# Patient Record
Sex: FEMALE | Race: ASIAN
[De-identification: names, ages, dates, MRNs, and addresses within clinical notes are randomized per-mention and may not be internally consistent; named-entity substitution may affect disease eponyms.]

---

## 2017-01-27 ENCOUNTER — CHARTING TRANS (OUTPATIENT)
Dept: OTHER | Age: 37
End: 2017-01-27

## 2017-05-11 ENCOUNTER — CHARTING TRANS (OUTPATIENT)
Dept: OTHER | Age: 37
End: 2017-05-11

## 2017-05-12 ENCOUNTER — CHARTING TRANS (OUTPATIENT)
Dept: OTHER | Age: 37
End: 2017-05-12

## 2017-05-20 ENCOUNTER — LAB SERVICES (OUTPATIENT)
Dept: OTHER | Age: 37
End: 2017-05-20

## 2017-05-20 LAB — TSH SERPL-ACNC: 2.41 M[IU]/L (ref 0.3–4.82)

## 2017-05-24 ENCOUNTER — CHARTING TRANS (OUTPATIENT)
Dept: OTHER | Age: 37
End: 2017-05-24

## 2017-05-25 ENCOUNTER — CHARTING TRANS (OUTPATIENT)
Dept: OTHER | Age: 37
End: 2017-05-25

## 2017-10-09 ENCOUNTER — CHARTING TRANS (OUTPATIENT)
Dept: OTHER | Age: 37
End: 2017-10-09

## 2017-10-11 ENCOUNTER — LAB SERVICES (OUTPATIENT)
Dept: OTHER | Age: 37
End: 2017-10-11

## 2017-10-11 LAB — TSH SERPL-ACNC: 3.31 M[IU]/L (ref 0.3–4.82)

## 2017-10-19 ENCOUNTER — CHARTING TRANS (OUTPATIENT)
Dept: OTHER | Age: 37
End: 2017-10-19

## 2017-12-29 ENCOUNTER — LAB SERVICES (OUTPATIENT)
Dept: OTHER | Age: 37
End: 2017-12-29

## 2017-12-29 LAB — TSH SERPL-ACNC: 1.16 M[IU]/L (ref 0.3–4.82)

## 2018-01-03 ENCOUNTER — CHARTING TRANS (OUTPATIENT)
Dept: OTHER | Age: 38
End: 2018-01-03

## 2018-01-12 ENCOUNTER — CHARTING TRANS (OUTPATIENT)
Dept: OTHER | Age: 38
End: 2018-01-12

## 2018-06-21 ENCOUNTER — LAB SERVICES (OUTPATIENT)
Dept: OTHER | Age: 38
End: 2018-06-21

## 2018-06-21 ENCOUNTER — MYAURORA ACCOUNT LINK (OUTPATIENT)
Dept: OTHER | Age: 38
End: 2018-06-21

## 2018-06-21 ENCOUNTER — CHARTING TRANS (OUTPATIENT)
Dept: OTHER | Age: 38
End: 2018-06-21

## 2018-06-22 LAB — TSH SERPL DL<=0.05 MIU/L-ACNC: 2.39 M[IU]/L (ref 0.3–4.82)

## 2018-07-31 ENCOUNTER — CHARTING TRANS (OUTPATIENT)
Dept: OTHER | Age: 38
End: 2018-07-31

## 2018-08-02 ENCOUNTER — LAB SERVICES (OUTPATIENT)
Dept: OTHER | Age: 38
End: 2018-08-02

## 2018-08-02 LAB — TSH SERPL DL<=0.05 MIU/L-ACNC: 2.96 M[IU]/L (ref 0.3–4.82)

## 2018-10-16 ENCOUNTER — APPOINTMENT (OUTPATIENT)
Dept: LAB | Age: 38
End: 2018-10-16
Attending: FAMILY MEDICINE
Payer: COMMERCIAL

## 2018-10-16 ENCOUNTER — OFFICE VISIT (OUTPATIENT)
Dept: FAMILY MEDICINE CLINIC | Facility: CLINIC | Age: 38
End: 2018-10-16
Payer: COMMERCIAL

## 2018-10-16 VITALS
DIASTOLIC BLOOD PRESSURE: 62 MMHG | WEIGHT: 162.38 LBS | HEART RATE: 69 BPM | RESPIRATION RATE: 18 BRPM | BODY MASS INDEX: 28.77 KG/M2 | OXYGEN SATURATION: 99 % | HEIGHT: 63 IN | TEMPERATURE: 98 F | SYSTOLIC BLOOD PRESSURE: 114 MMHG

## 2018-10-16 DIAGNOSIS — R07.0 THROAT PAIN: ICD-10-CM

## 2018-10-16 DIAGNOSIS — R45.4 IRRITABILITY AND ANGER: ICD-10-CM

## 2018-10-16 DIAGNOSIS — N64.52 NIPPLE DISCHARGE: ICD-10-CM

## 2018-10-16 DIAGNOSIS — E03.9 HYPOTHYROIDISM, UNSPECIFIED TYPE: Primary | ICD-10-CM

## 2018-10-16 DIAGNOSIS — Z13.1 SCREENING FOR DIABETES MELLITUS: ICD-10-CM

## 2018-10-16 DIAGNOSIS — Z13.0 SCREENING FOR DEFICIENCY ANEMIA: ICD-10-CM

## 2018-10-16 DIAGNOSIS — Z13.220 SCREENING FOR LIPOID DISORDERS: ICD-10-CM

## 2018-10-16 DIAGNOSIS — Z23 NEED FOR VACCINATION: ICD-10-CM

## 2018-10-16 PROCEDURE — 99204 OFFICE O/P NEW MOD 45 MIN: CPT | Performed by: FAMILY MEDICINE

## 2018-10-16 PROCEDURE — 90686 IIV4 VACC NO PRSV 0.5 ML IM: CPT | Performed by: FAMILY MEDICINE

## 2018-10-16 PROCEDURE — 90471 IMMUNIZATION ADMIN: CPT | Performed by: FAMILY MEDICINE

## 2018-10-16 NOTE — PROGRESS NOTES
Jake Villavicencio is a 41year old female. Patient presents with:  Establish Care: New patient: Pt would like Dr to maintain thyroid medication. HPI:   Patient is seen for initial visit.     Hypothyroidism on levothyroxine 88mcg, had labs d exercise.     REVIEW OF SYSTEMS:   GENERAL HEALTH: feels well otherwise  ENT: as per HPI  SKIN: denies any unusual skin lesions or rashes  RESPIRATORY: denies shortness of breath with exertion  CARDIOVASCULAR: denies chest pain on exertion  GI: denies heart

## 2018-10-16 NOTE — PATIENT INSTRUCTIONS
Please avoid stimulating your nipple. Please get your labs done and schedule an appointment for a physical and pap.

## 2018-11-19 ENCOUNTER — APPOINTMENT (OUTPATIENT)
Dept: LAB | Age: 38
End: 2018-11-19
Attending: FAMILY MEDICINE
Payer: COMMERCIAL

## 2018-11-19 ENCOUNTER — OFFICE VISIT (OUTPATIENT)
Dept: FAMILY MEDICINE CLINIC | Facility: CLINIC | Age: 38
End: 2018-11-19
Payer: COMMERCIAL

## 2018-11-19 VITALS
BODY MASS INDEX: 28.73 KG/M2 | SYSTOLIC BLOOD PRESSURE: 102 MMHG | HEIGHT: 62.5 IN | HEART RATE: 79 BPM | OXYGEN SATURATION: 99 % | RESPIRATION RATE: 16 BRPM | WEIGHT: 160.13 LBS | DIASTOLIC BLOOD PRESSURE: 60 MMHG | TEMPERATURE: 97 F

## 2018-11-19 DIAGNOSIS — Z01.419 WELL WOMAN EXAM WITH ROUTINE GYNECOLOGICAL EXAM: ICD-10-CM

## 2018-11-19 DIAGNOSIS — N64.52 NIPPLE DISCHARGE: ICD-10-CM

## 2018-11-19 DIAGNOSIS — Z00.00 ROUTINE GENERAL MEDICAL EXAMINATION AT A HEALTH CARE FACILITY: Primary | ICD-10-CM

## 2018-11-19 PROCEDURE — 88175 CYTOPATH C/V AUTO FLUID REDO: CPT | Performed by: FAMILY MEDICINE

## 2018-11-19 PROCEDURE — 99395 PREV VISIT EST AGE 18-39: CPT | Performed by: FAMILY MEDICINE

## 2018-11-19 PROCEDURE — 87624 HPV HI-RISK TYP POOLED RSLT: CPT | Performed by: FAMILY MEDICINE

## 2018-11-19 NOTE — PROGRESS NOTES
Teo Zaidi is a 41year old female here for Patient presents with: Well Adult: Physical and pap    HPI:   Patient is seen for annual physical and pap.   Previous paps were normal    PAST MEDICAL HISTORY:     Diagnosis Date   • Hypothyro nasal congestion, allergies, sinus pain, nosebleed or sore throat. Heme/lymph: No unusual bleeding or bruising, no lymph node enlargement or tenderness. Endocrine: No thyroid symptoms. No symptoms of diabetes.   Respiratory: No cough, shortness of breath done. Bimanual: No cervical motion tenderness. Uterus anteverted. Adnexae nontender, no masses. MUSCULOSKELETAL: Back and extremities within normal limits  EXTREMITIES: no cyanosis, clubbing or edema.  Peripheral pulses normal.  NEURO: Nonfocal exam. Linnea Bledsoe

## 2018-11-19 NOTE — PATIENT INSTRUCTIONS
Prevention Guidelines, Women Ages 25 to 44  Screening tests and vaccines are an important part of managing your health. A screening test is done to find possible disorders or diseases in people who don't have any symptoms.  The goal is to find a disease e Type 2 diabetes All women with prediabetes Every year   Gonorrhea Sexually active women at increased risk for infection At routine exams   Hepatitis C Anyone at increased risk At routine exams   HIV All women should be tested at least once for HIV between Meningococcal Women at increased risk for infection should talk with their healthcare provider 1 or more doses   Pneumococcal conjugate vaccine (PCV13) and pneumococcal polysaccharide vaccine (PPSV23) Women at increased risk for infection should talk with © 3470-6817 [address]. [address] [facility] [place] [facility] [place]. All rights reserved. This information is not intended as a substitute for professional medical care. Always follow your healthcare professional's instructions.

## 2018-11-23 ENCOUNTER — IMAGING SERVICES (OUTPATIENT)
Dept: OTHER | Age: 38
End: 2018-11-23

## 2018-11-28 VITALS
SYSTOLIC BLOOD PRESSURE: 92 MMHG | DIASTOLIC BLOOD PRESSURE: 60 MMHG | BODY MASS INDEX: 26.75 KG/M2 | WEIGHT: 151 LBS | HEIGHT: 63 IN | HEART RATE: 66 BPM

## 2018-11-28 VITALS
BODY MASS INDEX: 26.22 KG/M2 | HEART RATE: 64 BPM | SYSTOLIC BLOOD PRESSURE: 110 MMHG | DIASTOLIC BLOOD PRESSURE: 62 MMHG | WEIGHT: 148 LBS | HEIGHT: 63 IN

## 2018-12-24 DIAGNOSIS — E03.8 OTHER SPECIFIED HYPOTHYROIDISM: Primary | ICD-10-CM

## 2019-03-05 VITALS
DIASTOLIC BLOOD PRESSURE: 60 MMHG | WEIGHT: 156 LBS | SYSTOLIC BLOOD PRESSURE: 96 MMHG | BODY MASS INDEX: 27.64 KG/M2 | HEIGHT: 63 IN | HEART RATE: 68 BPM

## 2019-05-17 ENCOUNTER — PATIENT MESSAGE (OUTPATIENT)
Dept: FAMILY MEDICINE CLINIC | Facility: CLINIC | Age: 39
End: 2019-05-17

## 2019-05-17 DIAGNOSIS — E03.9 HYPOTHYROIDISM, UNSPECIFIED TYPE: Primary | ICD-10-CM

## 2019-05-19 NOTE — TELEPHONE ENCOUNTER
Labs for TSH are due in October and all other routine lbs are due in November, advised patient o schedule an appointment for November abd she can come fasting for her labs.

## 2019-05-19 NOTE — TELEPHONE ENCOUNTER
[facility], please advise?     Last labs done in Oct and November    No future orders

## 2019-05-19 NOTE — TELEPHONE ENCOUNTER
From: Katt Lambert  To:  Faby Lawson MD  Sent: 5/17/2019 11:19 PM CDT  Subject: Non-Urgent Medical Question    Coleman Rodriguez let me know when my next lab test is scheduled to be     Thanks in advance,  [address]

## 2019-05-20 ENCOUNTER — PATIENT MESSAGE (OUTPATIENT)
Dept: FAMILY MEDICINE CLINIC | Facility: CLINIC | Age: 39
End: 2019-05-20

## 2019-05-20 NOTE — TELEPHONE ENCOUNTER
Dr Tawnya Tyler, here are your last lab notes    No repeat lipids were ordered just TSH per last My chart message the TSH was not due until October?     Viewed by Christobal Peabody on 10/20/2018 10:49 PM   Written by Luan Griffin MD on 10/18/2018 12

## 2019-05-20 NOTE — TELEPHONE ENCOUNTER
From: Christobal Peabody  To:  Luan Griffin MD  Sent: 5/20/2019 11:46 AM CDT  Subject: Non-Urgent Medical Question    Hello doctor,  Thanks for the response regarding my TSH blood test.  I remember that u told me to do blood test for LDL,

## 2019-05-20 NOTE — TELEPHONE ENCOUNTER
Patient was supposed to have done her thyroid labs in October, please inform her we can recheck her lipids in November when she is in for her physical, she can continue to limit saturated fats and cholesterol in diet.

## 2019-12-05 ENCOUNTER — PATIENT MESSAGE (OUTPATIENT)
Dept: FAMILY MEDICINE CLINIC | Facility: CLINIC | Age: 39
End: 2019-12-05

## 2019-12-05 NOTE — TELEPHONE ENCOUNTER
From: Radha Dickerson  To: Gege Eid MD  Sent: 12/5/2019 12:47 PM CST  Subject: Other    Hi,  I have an upcoming appointment with  on Dec 9th. Please let me know if have to go for lab test prior to my visit.   Also I have

## 2019-12-09 ENCOUNTER — OFFICE VISIT (OUTPATIENT)
Dept: FAMILY MEDICINE CLINIC | Facility: CLINIC | Age: 39
End: 2019-12-09
Payer: COMMERCIAL

## 2019-12-09 ENCOUNTER — APPOINTMENT (OUTPATIENT)
Dept: LAB | Age: 39
End: 2019-12-09
Attending: FAMILY MEDICINE
Payer: COMMERCIAL

## 2019-12-09 VITALS
DIASTOLIC BLOOD PRESSURE: 62 MMHG | HEART RATE: 60 BPM | OXYGEN SATURATION: 99 % | WEIGHT: 143 LBS | HEIGHT: 62.5 IN | BODY MASS INDEX: 25.66 KG/M2 | TEMPERATURE: 98 F | SYSTOLIC BLOOD PRESSURE: 98 MMHG | RESPIRATION RATE: 18 BRPM

## 2019-12-09 DIAGNOSIS — Z00.00 ROUTINE GENERAL MEDICAL EXAMINATION AT A HEALTH CARE FACILITY: ICD-10-CM

## 2019-12-09 DIAGNOSIS — E03.9 HYPOTHYROIDISM, UNSPECIFIED TYPE: ICD-10-CM

## 2019-12-09 DIAGNOSIS — Z00.00 ROUTINE GENERAL MEDICAL EXAMINATION AT A HEALTH CARE FACILITY: Primary | ICD-10-CM

## 2019-12-09 DIAGNOSIS — Z23 NEED FOR VACCINATION: ICD-10-CM

## 2019-12-09 PROCEDURE — 84439 ASSAY OF FREE THYROXINE: CPT

## 2019-12-09 PROCEDURE — 84443 ASSAY THYROID STIM HORMONE: CPT

## 2019-12-09 PROCEDURE — 90471 IMMUNIZATION ADMIN: CPT | Performed by: FAMILY MEDICINE

## 2019-12-09 PROCEDURE — 99395 PREV VISIT EST AGE 18-39: CPT | Performed by: FAMILY MEDICINE

## 2019-12-09 PROCEDURE — 90686 IIV4 VACC NO PRSV 0.5 ML IM: CPT | Performed by: FAMILY MEDICINE

## 2019-12-09 NOTE — PATIENT INSTRUCTIONS
Prevention Guidelines, Women Ages 25 to 44  Screening tests and vaccines are an important part of managing your health. A screening test is done to find possible disorders or diseases in people who don't have any symptoms.  The goal is to find a disease e Type 2 diabetes, prediabetes All women diagnosed with gestational diabetes Lifelong testing every 3 years   Type 2 diabetes All women with prediabetes Every year   Gonorrhea Sexually active women at increased risk for infection At routine exams   Hepatitis Measles, mumps, rubella (MMR) All women in this age group who have no record of these infections or vaccines 1 or 2 doses   Meningococcal Women at increased risk for infection should talk with their healthcare provider 1 or more doses   Pneumococcal conjug © 4164-0293 [address]. [facility] [facility] [place] [place]. All rights reserved. This information is not intended as a substitute for professional medical care. Always follow your healthcare professional's instructions.

## 2019-12-09 NOTE — PROGRESS NOTES
Ryley Scruggs is a 46year old female.   Patient presents with:  Physical    HPI:   Patient is seen for annual physical  Pap done last year was normal.    Hypothyroidism: compliant with medication, patient states is currently taking Thyrono palpitations. Gastrointestinal: Negative for nausea, abdominal pain, constipation and blood in stool. Endocrine: Negative for cold intolerance, heat intolerance and polyuria.    Genitourinary: Negative for dysuria, urgency, frequency and difficulty urin time. She has normal reflexes. Skin: Skin is warm. No rash noted. Psychiatric: She has a normal mood and affect.  Her behavior is normal. Judgment and thought content normal.     ASSESSMENT AND PLAN:   Edy Winkler was seen today for physical.    Diagnoses

## 2020-04-22 ENCOUNTER — TELEMEDICINE (OUTPATIENT)
Dept: FAMILY MEDICINE CLINIC | Facility: CLINIC | Age: 40
End: 2020-04-22

## 2020-04-22 ENCOUNTER — PATIENT MESSAGE (OUTPATIENT)
Dept: FAMILY MEDICINE CLINIC | Facility: CLINIC | Age: 40
End: 2020-04-22

## 2020-04-22 ENCOUNTER — E-VISIT (OUTPATIENT)
Dept: FAMILY MEDICINE CLINIC | Facility: CLINIC | Age: 40
End: 2020-04-22

## 2020-04-22 DIAGNOSIS — R21 RASH AND NONSPECIFIC SKIN ERUPTION: Primary | ICD-10-CM

## 2020-04-22 DIAGNOSIS — Z02.9 ADMINISTRATIVE ENCOUNTER: Primary | ICD-10-CM

## 2020-04-22 DIAGNOSIS — K59.00 CONSTIPATION, UNSPECIFIED CONSTIPATION TYPE: ICD-10-CM

## 2020-04-22 PROCEDURE — 99213 OFFICE O/P EST LOW 20 MIN: CPT | Performed by: FAMILY MEDICINE

## 2020-04-22 RX ORDER — PREDNISONE 10 MG/1
TABLET ORAL
Qty: 21 TABLET | Refills: 0 | Status: SHIPPED | OUTPATIENT
Start: 2020-04-22 | End: 2020-04-28

## 2020-04-22 RX ORDER — LEVOTHYROXINE SODIUM 88 UG/1
TABLET ORAL
COMMUNITY
Start: 2015-10-15 | End: 2020-08-06

## 2020-04-22 NOTE — TELEPHONE ENCOUNTER
From: [place]  To: Rocio Decker MD  Sent: 4/22/2020 8:31 AM CDT  Subject: Non-Urgent Jolanta Gan Dr,  I m facing a skin problem now. It starts like a small mosquito bite and after itching it becomes like boils.  It

## 2020-04-22 NOTE — PROGRESS NOTES
Lisa Valdes is a 45year old female.   Patient presents with:  Skin:  itching    HPI:   Lisa Valdes is a 45year old female seen via video visit complaining of rash with intermittent itching for the past 4-5 days, state Rash and nonspecific skin eruption  -     predniSONE 10 MG Oral Tab;  Take 6 tablets (60 mg total) by mouth daily for 1 day, THEN 5 tablets (50 mg total) daily for 1 day, THEN 4 tablets (40 mg total) daily for 1 day, THEN 3 tablets (30 mg total) daily for 1

## 2020-04-22 NOTE — PATIENT INSTRUCTIONS
Please drink prune juice daily to see if it helps with the constipation, you can take over the counter Miralax if needed. If rash is not better in 1 week, I would like to see you in the office.     Treating Constipation    Constipation is a common and of constipation, to rule out other causes such as medicines or thyroid disease. Date Last Reviewed: 7/1/2016  © 1621-1615 [address]. [address] [facility] [place] [address] [place] [address]. All rights reserved.  This information is not intended as a substit

## 2020-04-22 NOTE — TELEPHONE ENCOUNTER
Routing to provider so you have pics available    [facility] desk team, please call to schedule patient for video visit today with Dr. Elaine Anderson    Thanks!

## 2020-04-28 ENCOUNTER — OFFICE VISIT (OUTPATIENT)
Dept: FAMILY MEDICINE CLINIC | Facility: CLINIC | Age: 40
End: 2020-04-28
Payer: COMMERCIAL

## 2020-04-28 VITALS
RESPIRATION RATE: 16 BRPM | WEIGHT: 152 LBS | HEIGHT: 62.5 IN | TEMPERATURE: 98 F | HEART RATE: 81 BPM | DIASTOLIC BLOOD PRESSURE: 78 MMHG | BODY MASS INDEX: 27.27 KG/M2 | SYSTOLIC BLOOD PRESSURE: 100 MMHG | OXYGEN SATURATION: 99 %

## 2020-04-28 DIAGNOSIS — R21 RASH AND NONSPECIFIC SKIN ERUPTION: Primary | ICD-10-CM

## 2020-04-28 PROCEDURE — 99213 OFFICE O/P EST LOW 20 MIN: CPT | Performed by: FAMILY MEDICINE

## 2020-04-28 NOTE — PROGRESS NOTES
Ryley Scruggs is a 45year old female.   Patient presents with:  Rash Skin Problem    HPI:   Ryley Scruggs is a 45year old female lisa today for follow up of rash, patient states finished the oral steroid as prescribed tod without murmur    ASSESSMENT AND PLAN:   Latonia Peterson was seen today for rash skin problem. Diagnoses and all orders for this visit:    Rash and nonspecific skin eruption  -     DERM - INTERNAL  Called 's office and patient will be seen today.

## 2020-05-07 ENCOUNTER — MED REC SCAN ONLY (OUTPATIENT)
Dept: FAMILY MEDICINE CLINIC | Facility: CLINIC | Age: 40
End: 2020-05-07

## 2020-06-25 ENCOUNTER — PATIENT MESSAGE (OUTPATIENT)
Dept: FAMILY MEDICINE CLINIC | Facility: CLINIC | Age: 40
End: 2020-06-25

## 2020-06-26 RX ORDER — LEVOTHYROXINE SODIUM 88 UG/1
88 TABLET ORAL
Qty: 90 TABLET | Refills: 1 | Status: SHIPPED | OUTPATIENT
Start: 2020-06-26 | End: 2020-06-29

## 2020-06-26 NOTE — TELEPHONE ENCOUNTER
From: Gisel Colunga  To: Mehnaz Escalera MD  Sent: 6/25/2020 12:58 PM CDT  Subject: Prescription Question    [facility] doctor,  How are you?   I hereby request you for prescription med refill for levothyroxine 88 mcg through [employer]

## 2020-06-27 ENCOUNTER — PATIENT MESSAGE (OUTPATIENT)
Dept: FAMILY MEDICINE CLINIC | Facility: CLINIC | Age: 40
End: 2020-06-27

## 2020-06-29 RX ORDER — LEVOTHYROXINE SODIUM 88 UG/1
88 TABLET ORAL
Qty: 90 TABLET | Refills: 1 | Status: SHIPPED | OUTPATIENT
Start: 2020-06-29 | End: 2020-07-06

## 2020-06-29 NOTE — TELEPHONE ENCOUNTER
From: Jake Villavicencio  To:  Daryle Powers, MD  Sent: 6/27/2020 3:41 PM CDT  Subject: Prescription Question    Hello nurse/ doctor,  I received message from [facility] pharmacy for prescription refill whereas I had requested for refill in [facility]

## 2020-07-01 ENCOUNTER — OFFICE VISIT (OUTPATIENT)
Dept: FAMILY MEDICINE CLINIC | Facility: CLINIC | Age: 40
End: 2020-07-01
Payer: COMMERCIAL

## 2020-07-01 ENCOUNTER — TELEPHONE (OUTPATIENT)
Dept: FAMILY MEDICINE CLINIC | Facility: CLINIC | Age: 40
End: 2020-07-01

## 2020-07-01 VITALS
TEMPERATURE: 98 F | WEIGHT: 147 LBS | RESPIRATION RATE: 18 BRPM | SYSTOLIC BLOOD PRESSURE: 98 MMHG | HEART RATE: 82 BPM | DIASTOLIC BLOOD PRESSURE: 60 MMHG | HEIGHT: 62.5 IN | BODY MASS INDEX: 26.38 KG/M2 | OXYGEN SATURATION: 98 %

## 2020-07-01 DIAGNOSIS — R07.0 THROAT PAIN: ICD-10-CM

## 2020-07-01 DIAGNOSIS — E03.9 HYPOTHYROIDISM, UNSPECIFIED TYPE: Primary | ICD-10-CM

## 2020-07-01 DIAGNOSIS — R19.8 GLOBUS SENSATION: ICD-10-CM

## 2020-07-01 PROCEDURE — 99213 OFFICE O/P EST LOW 20 MIN: CPT | Performed by: FAMILY MEDICINE

## 2020-07-01 NOTE — TELEPHONE ENCOUNTER
Patient returned call , she said it is more thyroid related , there is discomfort when swallowing , no soar throat no other symptoms at all . She said it feels like something is stuck in her throat .

## 2020-07-01 NOTE — PROGRESS NOTES
Evelia Pond is a 45year old female.   Patient presents with:  Throat Problem    HPI:   Evelia Pond is a 45year old female complaining of throat discomfort on and off for the past several months, patient states feels l tenderness    ASSESSMENT AND PLAN:   Thurpj Jc was seen today for throat problem.     Diagnoses and all orders for this visit:    Hypothyroidism, unspecified type  -     ASSAY, THYROID STIM HORMONE  -     FREE T4 (FREE THYROXINE)  -     TRIIODOTHYRONINE (T3

## 2020-07-01 NOTE — TELEPHONE ENCOUNTER
Pt scheduled for throat discomfort today at 11    I left message for the patient to call back. Needs to be triaged.

## 2020-07-02 LAB
T3, TOTAL: 98 NG/DL (ref 76–181)
T4, FREE: 1.4 NG/DL (ref 0.8–1.8)
THYROGLOBULIN ANTIBODIES: 4 IU/ML
THYROID PEROXIDASE$ANTIBODIES: 1 IU/ML
TSH: 0.26 MIU/L

## 2020-07-06 ENCOUNTER — TELEPHONE (OUTPATIENT)
Dept: FAMILY MEDICINE CLINIC | Facility: CLINIC | Age: 40
End: 2020-07-06

## 2020-07-06 RX ORDER — LEVOTHYROXINE SODIUM 88 UG/1
88 TABLET ORAL
Qty: 90 TABLET | Refills: 0 | Status: SHIPPED | OUTPATIENT
Start: 2020-07-06 | End: 2021-09-03

## 2020-07-06 NOTE — TELEPHONE ENCOUNTER
Patient had labs done and saw results on Mychart. Would like to speak to someone about questions he has.

## 2020-07-06 NOTE — TELEPHONE ENCOUNTER
Notes recorded by Raisa Herrmann MD on 7/2/2020 at 10:05 PM CDT  Thyroid function is too well controlled, please do not take an additional dose once a week and stay on the current dose. Recheck labs in 6 weeks.     Called pt to inform per PCP indicated

## 2021-03-27 ENCOUNTER — TELEPHONE (OUTPATIENT)
Dept: SCHEDULING | Age: 41
End: 2021-03-27

## 2021-06-08 ENCOUNTER — TELEPHONE (OUTPATIENT)
Dept: FAMILY MEDICINE CLINIC | Facility: CLINIC | Age: 41
End: 2021-06-08

## 2021-06-08 NOTE — TELEPHONE ENCOUNTER
Called patient regarding dizziness. States started about a week ago, only once in awhile at first.  Now is having more frequent episodes of feeling dizzy. Denies and hx heat trauma. Denies HA, visual changes, N/T, N/V.  States she actually feels better is

## 2021-06-08 NOTE — TELEPHONE ENCOUNTER
Called pt to schedule annual pe, asked for sooner date than 23rd. Said she has been experiencing dizziness for the last 7-10 days. Scheduled her for appt for both separately. Does she need to be seen sooner or triaged? Please advise.      Future Appointment

## 2021-06-10 ENCOUNTER — OFFICE VISIT (OUTPATIENT)
Dept: FAMILY MEDICINE CLINIC | Facility: CLINIC | Age: 41
End: 2021-06-10
Payer: COMMERCIAL

## 2021-06-10 VITALS
RESPIRATION RATE: 18 BRPM | DIASTOLIC BLOOD PRESSURE: 72 MMHG | HEART RATE: 80 BPM | BODY MASS INDEX: 26.22 KG/M2 | SYSTOLIC BLOOD PRESSURE: 100 MMHG | WEIGHT: 148 LBS | TEMPERATURE: 98 F | OXYGEN SATURATION: 98 % | HEIGHT: 63 IN

## 2021-06-10 DIAGNOSIS — R42 DIZZINESS: Primary | ICD-10-CM

## 2021-06-10 DIAGNOSIS — E03.9 HYPOTHYROIDISM, UNSPECIFIED TYPE: ICD-10-CM

## 2021-06-10 DIAGNOSIS — Z13.0 SCREENING FOR DEFICIENCY ANEMIA: ICD-10-CM

## 2021-06-10 DIAGNOSIS — Z12.31 ENCOUNTER FOR SCREENING MAMMOGRAM FOR MALIGNANT NEOPLASM OF BREAST: ICD-10-CM

## 2021-06-10 DIAGNOSIS — R53.83 FATIGUE, UNSPECIFIED TYPE: ICD-10-CM

## 2021-06-10 DIAGNOSIS — Z13.220 SCREENING FOR LIPOID DISORDERS: ICD-10-CM

## 2021-06-10 PROCEDURE — 3078F DIAST BP <80 MM HG: CPT | Performed by: FAMILY MEDICINE

## 2021-06-10 PROCEDURE — 3008F BODY MASS INDEX DOCD: CPT | Performed by: FAMILY MEDICINE

## 2021-06-10 PROCEDURE — 99213 OFFICE O/P EST LOW 20 MIN: CPT | Performed by: FAMILY MEDICINE

## 2021-06-10 PROCEDURE — 3074F SYST BP LT 130 MM HG: CPT | Performed by: FAMILY MEDICINE

## 2021-06-10 RX ORDER — LEVOTHYROXINE SODIUM 20 UG/ML
INJECTION, SOLUTION INTRAVENOUS
COMMUNITY
Start: 2015-05-01 | End: 2021-06-10 | Stop reason: CLARIF

## 2021-06-10 NOTE — PROGRESS NOTES
Radha Dickerson is a 37year old female.   Patient presents with:  Dizziness    HPI:   Radha Dickerson is a 37year old female with history of hypothyroidism complaining of dizziness for the past 2 weeks, states has noticed it DIFFERENTIAL WITH PLATELET    Screening for lipoid disorders  -     LIPID PANEL; Future  -     LIPID PANEL    Fatigue, unspecified type  -     COMP METABOLIC PANEL (14);  Future  -     VITAMIN D, 25-HYDROXY; Future  -     COMP METABOLIC PANEL (14)  -     VI

## 2021-06-10 NOTE — PATIENT INSTRUCTIONS
Please keep a log of your symptoms. Get an eye exam done. Will call with lab results when available    Keep the temperature at home cool at 72 degrees, hydrate well.

## 2021-06-15 ENCOUNTER — PATIENT MESSAGE (OUTPATIENT)
Dept: FAMILY MEDICINE CLINIC | Facility: CLINIC | Age: 41
End: 2021-06-15

## 2021-06-16 NOTE — TELEPHONE ENCOUNTER
From: Jenni Mauro  To: Pilar Luo MD  Sent: 6/15/2021 10:30 PM CDT  Subject: Test Results Question    Hello doctor,  How are my test results? I didn't get any follow up call till now.   Thanks and Christophe Manuel

## 2021-06-17 RX ORDER — ERGOCALCIFEROL 1.25 MG/1
50000 CAPSULE ORAL WEEKLY
Qty: 4 CAPSULE | Refills: 2 | Status: SHIPPED | OUTPATIENT
Start: 2021-06-17 | End: 2021-09-17

## 2021-06-17 NOTE — TELEPHONE ENCOUNTER
Pt calling back to follow up on vitamin D. Let her know I spoke with nurse and that we are working on getting the order sent over asap. Pt would like to  today.

## 2021-06-17 NOTE — TELEPHONE ENCOUNTER
Pt following up on medication for vitamin D. Would like a call back once it is sent over to the pharmacy. Please advise.

## 2021-06-25 ENCOUNTER — OFFICE VISIT (OUTPATIENT)
Dept: FAMILY MEDICINE CLINIC | Facility: CLINIC | Age: 41
End: 2021-06-25
Payer: COMMERCIAL

## 2021-06-25 VITALS
BODY MASS INDEX: 26.22 KG/M2 | WEIGHT: 148 LBS | HEIGHT: 63 IN | RESPIRATION RATE: 18 BRPM | HEART RATE: 62 BPM | SYSTOLIC BLOOD PRESSURE: 98 MMHG | TEMPERATURE: 97 F | OXYGEN SATURATION: 100 % | DIASTOLIC BLOOD PRESSURE: 62 MMHG

## 2021-06-25 DIAGNOSIS — E55.9 VITAMIN D DEFICIENCY: ICD-10-CM

## 2021-06-25 DIAGNOSIS — Z00.00 ROUTINE GENERAL MEDICAL EXAMINATION AT A HEALTH CARE FACILITY: Primary | ICD-10-CM

## 2021-06-25 DIAGNOSIS — R42 DIZZINESS: ICD-10-CM

## 2021-06-25 DIAGNOSIS — Z12.31 ENCOUNTER FOR SCREENING MAMMOGRAM FOR MALIGNANT NEOPLASM OF BREAST: ICD-10-CM

## 2021-06-25 DIAGNOSIS — E03.9 HYPOTHYROIDISM, UNSPECIFIED TYPE: ICD-10-CM

## 2021-06-25 PROCEDURE — 99396 PREV VISIT EST AGE 40-64: CPT | Performed by: FAMILY MEDICINE

## 2021-06-25 PROCEDURE — 3078F DIAST BP <80 MM HG: CPT | Performed by: FAMILY MEDICINE

## 2021-06-25 PROCEDURE — 3008F BODY MASS INDEX DOCD: CPT | Performed by: FAMILY MEDICINE

## 2021-06-25 PROCEDURE — 3074F SYST BP LT 130 MM HG: CPT | Performed by: FAMILY MEDICINE

## 2021-06-25 NOTE — PROGRESS NOTES
Dione Browning is a 37year old female.   Patient presents with:  Physical    HPI:   Dione Browning is a 37year old female with no significant past medical history seen for annual physical.  Pap is up-to-date  Does do breast Sexual activity: Yes        Partners: Male        Birth control/protection: Rhythm    Other Topics      Concerns:        Caffeine Concern: Yes          tea - 2 cups per day        Exercise: Yes          2x in a week - walking        Seat Belt: Yes        B Mucous membranes are moist.      Pharynx: Oropharynx is clear. Eyes:      Extraocular Movements: Extraocular movements intact. Conjunctiva/sclera: Conjunctivae normal.      Pupils: Pupils are equal, round, and reactive to light.    Neck:      Thyroid - 400 Thousand/uL 262   MPV      7.5 - 12.5 fL 10.9   NEUTROPHILS ABSOLUTE      1,500 - 7,800 cells/uL 2,151   ABSOLUTE LYMPHOCYTES      850 - 3,900 cells/uL 1,818   ABSOLUTE MONOCYTES      200 - 950 cells/uL 351   ABSOLUTE EOSINOPHILS      15 - 500 cells/ immune to varicella to fax us the reports and schedule a nurse appointment for vaccination. Encounter for screening mammogram for malignant neoplasm of breast  -     [facility] SHEILA 2D+3D SCREENING BILAT (CPT=[id number]);  Future    Hypothyroidism, unspecified

## 2021-06-25 NOTE — PATIENT INSTRUCTIONS
Prevention Guidelines, Women Ages 36 to 52  Screening tests and vaccines are an important part of managing your health. A screening test is done to find diseases in people who don't have any symptoms.  The goal is to find a disease early so lifestyle avelar sigmoidoscopy every 5 years, or  · Colonoscopy every 10 years, or  · CT colonography (virtual colonoscopy) every 5 years, or  · Yearly fecal occult blood test, or  · Yearly fecal immunochemical test every year, or  · Stool DNA test, every 3 years  If you c least 4 weeks after the first dose   Hepatitis A Women at increased risk for infection–talk with your healthcare provider 2 doses given 6 months apart   Hepatitis B Women at increased risk for infection–talk with your healthcare provider 3 doses over 6 mon American Academy of Ophthalmology  Silvia last reviewed this educational content on 11/1/2017  © 0787-6129 [employer] [address]. All rights reserved. This information is not intended as a substitute for professional medical care.  Always follow your

## 2021-06-28 ENCOUNTER — MED REC SCAN ONLY (OUTPATIENT)
Dept: FAMILY MEDICINE CLINIC | Facility: CLINIC | Age: 41
End: 2021-06-28

## 2021-08-22 ENCOUNTER — PATIENT MESSAGE (OUTPATIENT)
Dept: FAMILY MEDICINE CLINIC | Facility: CLINIC | Age: 41
End: 2021-08-22

## 2021-08-23 NOTE — TELEPHONE ENCOUNTER
From: Jake Villavicencio  To: Daryle Powers, MD  Sent: 8/22/2021 9:12 PM CDT  Subject: Visit Follow-up Question    Hello doctor,  My giddiness condition has not improved even after taking medication for Vitamin D supplements.  Gino sanders

## 2021-08-27 ENCOUNTER — TELEPHONE (OUTPATIENT)
Dept: FAMILY MEDICINE CLINIC | Facility: CLINIC | Age: 41
End: 2021-08-27

## 2021-08-27 NOTE — TELEPHONE ENCOUNTER
still not better - discussed issue at physical appt in June.   no openings for follow up until 9/13/21. patient does not want to wait that long.    please advise

## 2021-09-03 ENCOUNTER — OFFICE VISIT (OUTPATIENT)
Dept: FAMILY MEDICINE CLINIC | Facility: CLINIC | Age: 41
End: 2021-09-03
Payer: COMMERCIAL

## 2021-09-03 ENCOUNTER — TELEPHONE (OUTPATIENT)
Dept: FAMILY MEDICINE CLINIC | Facility: CLINIC | Age: 41
End: 2021-09-03

## 2021-09-03 VITALS
OXYGEN SATURATION: 99 % | RESPIRATION RATE: 16 BRPM | WEIGHT: 150 LBS | HEIGHT: 63 IN | BODY MASS INDEX: 26.58 KG/M2 | TEMPERATURE: 97 F

## 2021-09-03 DIAGNOSIS — R42 DIZZINESS: Primary | ICD-10-CM

## 2021-09-03 PROCEDURE — 99213 OFFICE O/P EST LOW 20 MIN: CPT | Performed by: FAMILY MEDICINE

## 2021-09-03 PROCEDURE — 3008F BODY MASS INDEX DOCD: CPT | Performed by: FAMILY MEDICINE

## 2021-09-03 NOTE — PATIENT INSTRUCTIONS
Please continue to monitor your symptoms and keep a symptom diary, will call with CT results when available. Please schedule appointment with physical therapist for evaluation.

## 2021-09-03 NOTE — TELEPHONE ENCOUNTER
Pt has an appointment scheduled on Tues 9/7/21 for Ct of sinus, rep said this test should be done w/o contrast so they need new order sent

## 2021-09-03 NOTE — PROGRESS NOTES
Karolina Herzog is a 37year old female. Patient presents with:  Dizziness: f/u.      HPI:   Karolina Herzog is a 37year old female seen for follow up of dizziness, Patient states the dizziness is more frequent, described it as are clear  NECK: supple,no adenopathy,no bruits  LUNGS: clear to auscultation  CARDIO: RRR without murmur  EXTREMITIES: no cyanosis, clubbing or edema  NEURO: CN 2-12 are normal, no focal motor or sensory deficits on exam.    ASSESSMENT AND PLAN:   Subashi

## 2022-11-15 ENCOUNTER — OFFICE VISIT (OUTPATIENT)
Dept: FAMILY MEDICINE CLINIC | Facility: CLINIC | Age: 42
End: 2022-11-15
Payer: COMMERCIAL

## 2022-11-15 VITALS
RESPIRATION RATE: 18 BRPM | SYSTOLIC BLOOD PRESSURE: 110 MMHG | WEIGHT: 135 LBS | HEIGHT: 63 IN | OXYGEN SATURATION: 98 % | TEMPERATURE: 98 F | BODY MASS INDEX: 23.92 KG/M2 | DIASTOLIC BLOOD PRESSURE: 72 MMHG | HEART RATE: 79 BPM

## 2022-11-15 DIAGNOSIS — Z12.31 ENCOUNTER FOR SCREENING MAMMOGRAM FOR MALIGNANT NEOPLASM OF BREAST: ICD-10-CM

## 2022-11-15 DIAGNOSIS — Z01.419 ENCOUNTER FOR ROUTINE GYNECOLOGICAL EXAMINATION WITH PAPANICOLAOU SMEAR OF CERVIX: ICD-10-CM

## 2022-11-15 DIAGNOSIS — Z00.00 ROUTINE GENERAL MEDICAL EXAMINATION AT A HEALTH CARE FACILITY: Primary | ICD-10-CM

## 2022-11-15 DIAGNOSIS — Z23 NEED FOR VACCINATION: ICD-10-CM

## 2022-11-15 DIAGNOSIS — E55.9 VITAMIN D DEFICIENCY: ICD-10-CM

## 2022-11-15 PROCEDURE — 3078F DIAST BP <80 MM HG: CPT | Performed by: FAMILY MEDICINE

## 2022-11-15 PROCEDURE — 90686 IIV4 VACC NO PRSV 0.5 ML IM: CPT | Performed by: FAMILY MEDICINE

## 2022-11-15 PROCEDURE — 87624 HPV HI-RISK TYP POOLED RSLT: CPT | Performed by: FAMILY MEDICINE

## 2022-11-15 PROCEDURE — 3008F BODY MASS INDEX DOCD: CPT | Performed by: FAMILY MEDICINE

## 2022-11-15 PROCEDURE — 99396 PREV VISIT EST AGE 40-64: CPT | Performed by: FAMILY MEDICINE

## 2022-11-15 PROCEDURE — 88175 CYTOPATH C/V AUTO FLUID REDO: CPT | Performed by: FAMILY MEDICINE

## 2022-11-15 PROCEDURE — 90471 IMMUNIZATION ADMIN: CPT | Performed by: FAMILY MEDICINE

## 2022-11-15 PROCEDURE — 3074F SYST BP LT 130 MM HG: CPT | Performed by: FAMILY MEDICINE

## 2022-11-16 LAB — HPV I/H RISK 1 DNA SPEC QL NAA+PROBE: NEGATIVE

## 2022-11-20 LAB
ABSOLUTE BASOPHILS: 22 CELLS/UL (ref 0–200)
ABSOLUTE EOSINOPHILS: 130 CELLS/UL (ref 15–500)
ABSOLUTE LYMPHOCYTES: 1771 CELLS/UL (ref 850–3900)
ABSOLUTE MONOCYTES: 356 CELLS/UL (ref 200–950)
ABSOLUTE NEUTROPHILS: 3121 CELLS/UL (ref 1500–7800)
ALBUMIN/GLOBULIN RATIO: 1.8 (CALC) (ref 1–2.5)
ALBUMIN: 4.6 G/DL (ref 3.6–5.1)
ALKALINE PHOSPHATASE: 68 U/L (ref 31–125)
ALT: 9 U/L (ref 6–29)
AST: 17 U/L (ref 10–30)
BASOPHILS: 0.4 %
BILIRUBIN, TOTAL: 0.6 MG/DL (ref 0.2–1.2)
BUN: 8 MG/DL (ref 7–25)
CALCIUM: 9.7 MG/DL (ref 8.6–10.2)
CARBON DIOXIDE: 28 MMOL/L (ref 20–32)
CHLORIDE: 104 MMOL/L (ref 98–110)
CHOL/HDLC RATIO: 3.6 (CALC)
CHOLESTEROL, TOTAL: 181 MG/DL
CREATININE: 0.6 MG/DL (ref 0.5–0.99)
EGFR: 116 ML/MIN/1.73M2
EOSINOPHILS: 2.4 %
GLOBULIN: 2.5 G/DL (CALC) (ref 1.9–3.7)
GLUCOSE: 95 MG/DL (ref 65–99)
HDL CHOLESTEROL: 50 MG/DL
HEMATOCRIT: 39.6 % (ref 35–45)
HEMOGLOBIN: 13.5 G/DL (ref 11.7–15.5)
LDL-CHOLESTEROL: 111 MG/DL (CALC)
LYMPHOCYTES: 32.8 %
MCH: 30.8 PG (ref 27–33)
MCHC: 34.1 G/DL (ref 32–36)
MCV: 90.4 FL (ref 80–100)
MONOCYTES: 6.6 %
MPV: 11.4 FL (ref 7.5–12.5)
NEUTROPHILS: 57.8 %
NON-HDL CHOLESTEROL: 131 MG/DL (CALC)
PLATELET COUNT: 248 THOUSAND/UL (ref 140–400)
POTASSIUM: 4.4 MMOL/L (ref 3.5–5.3)
PROTEIN, TOTAL: 7.1 G/DL (ref 6.1–8.1)
RDW: 12.1 % (ref 11–15)
RED BLOOD CELL COUNT: 4.38 MILLION/UL (ref 3.8–5.1)
SODIUM: 137 MMOL/L (ref 135–146)
TRIGLYCERIDES: 98 MG/DL
TSH: 2.28 MIU/L
VITAMIN D, 25-OH, TOTAL: 29 NG/ML (ref 30–100)
WHITE BLOOD CELL COUNT: 5.4 THOUSAND/UL (ref 3.8–10.8)

## 2022-11-21 LAB
LAST PAP RESULT: NORMAL
PAP HISTORY (OTHER THAN LAST PAP): NORMAL

## 2022-12-12 ENCOUNTER — HOSPITAL ENCOUNTER (OUTPATIENT)
Dept: MAMMOGRAPHY | Age: 42
Discharge: HOME | End: 2022-12-12
Attending: FAMILY MEDICINE
Payer: COMMERCIAL

## 2022-12-12 DIAGNOSIS — Z12.31 ENCOUNTER FOR SCREENING MAMMOGRAM FOR MALIGNANT NEOPLASM OF BREAST: ICD-10-CM

## 2022-12-12 PROCEDURE — 77063 BREAST TOMOSYNTHESIS BI: CPT | Performed by: FAMILY MEDICINE

## 2022-12-12 PROCEDURE — 77067 SCR MAMMO BI INCL CAD: CPT | Performed by: FAMILY MEDICINE

## 2022-12-13 ENCOUNTER — PATIENT MESSAGE (OUTPATIENT)
Dept: FAMILY MEDICINE CLINIC | Facility: CLINIC | Age: 42
End: 2022-12-13

## 2022-12-13 ENCOUNTER — TELEPHONE (OUTPATIENT)
Dept: FAMILY MEDICINE CLINIC | Facility: CLINIC | Age: 42
End: 2022-12-13

## 2022-12-13 NOTE — TELEPHONE ENCOUNTER
Pt called asking for the mammogram results. Wants to know why they are asking her to come back for another view. Wants to get it scheduled before end of year if it's needed.

## 2022-12-14 NOTE — TELEPHONE ENCOUNTER
Called patient - name and  verified. Patient reports both eyes are burning but left is worse than right. She was asked if she had applied anything and she said she used scrub on her face and it was something new. Denies any visual changes or pain. She has applied petroleum gel to help with burning. No itching either. She said she had mild swelling and it has gone down. She was offered an appointment for Friday, but said she will wait and see how she does and will call back tomorrow if still having issues to schedule an appointment.

## 2022-12-15 NOTE — TELEPHONE ENCOUNTER
Called patient and advised per her Breast Result letter, she does need to go back for additional mammogram views and/or US. She has already scheduled the appointment for next week. Informed a final report will be sent after that. Verbalizes understanding.     Future Appointments   Date Time Provider Christiano Silveira   12/20/2022 10:40 AM [facility] [address] [facility]

## 2022-12-20 ENCOUNTER — HOSPITAL ENCOUNTER (OUTPATIENT)
Dept: MAMMOGRAPHY | Facility: HOSPITAL | Age: 42
Discharge: HOME | End: 2022-12-20
Attending: FAMILY MEDICINE
Payer: COMMERCIAL

## 2022-12-20 DIAGNOSIS — R92.2 INCONCLUSIVE MAMMOGRAM: ICD-10-CM

## 2022-12-20 PROCEDURE — 77066 DX MAMMO INCL CAD BI: CPT | Performed by: FAMILY MEDICINE

## 2022-12-20 PROCEDURE — 77062 BREAST TOMOSYNTHESIS BI: CPT | Performed by: FAMILY MEDICINE

## 2023-03-22 ENCOUNTER — TELEPHONE (OUTPATIENT)
Dept: FAMILY MEDICINE CLINIC | Facility: CLINIC | Age: 43
End: 2023-03-22

## 2023-03-22 NOTE — TELEPHONE ENCOUNTER
Having abdominal pain for the past couple of months. Now the pain is getting worse. When she eats sometimes it bothers her and had vomiting last night. Also, she cut her chin since she fell while vomiting. Please triage.

## 2023-03-22 NOTE — TELEPHONE ENCOUNTER
VVMML for patient advising if she is having any acute pain with fever, V/D, blood in stool, she should go to ED for evaluation. Otherwise, informed no opening with Dr. Sharona Linder today or tomorrow. May be able to schedule appt with another provider tomorrow. Instructed to either go to ED or return call to nurse with more symptom detail. Office number given to return call. 4PM-    Patient returned call. States she has been experiencing increased episodes of upper midabdominal pain under the ribs. Notes mostly after eating. Also has burping and sometimes it makes her feel like vomiting. Had an episode of vomiting last night and fell and cut her chin. Went to IC as instructed in earlier message. Was not having abdominal pain when she went to IC and they instructed her to F/u with PCP. Advised for now to avoid any spicy food, caffeine, alcohol or other foods she has noted causes symptoms. Eat only bland foods like toast, applesauce, bananas, yogurt, rice until seen by Dr. Sharona Linder. Informed no open appt tomorrow or Friday. Will update Dr. Sharona Linder and call back with instructions. Dr. Sharona Linder, please advise when patient should be seen. You have an opening on Monday. Dr. Chepe Lira has opening tomorrow and Friday. Per IC note:  MDM  Assessment: Patient complained of a laceration to her chin secondary to a fall last night, likely due to a vasovagal episode secondary to vomiting. Plan: Wound Care instructions given. The patient was advised to schedule a wound check in 2 days at the immediate care center or with Primary Care Provider. Plan for suture removal in 7-8 days. Patient was informed of the increased risk of infection due to delay in seeking treatment. She was also informed that there are parts of the wound that cannot be repaired due to avulsion of the surrounding skin. The patient indicates understanding of the care plan and agrees to the plan.     Electronically signed by Aba Blount DO at 03/22/2023 3:35 PM CDT

## 2023-03-23 NOTE — TELEPHONE ENCOUNTER
Future Appointments   Date Time Provider Christiano Jordana   3/24/2023 12:40 PM David Romero MD [facility]

## 2023-03-24 ENCOUNTER — OFFICE VISIT (OUTPATIENT)
Dept: FAMILY MEDICINE CLINIC | Facility: CLINIC | Age: 43
End: 2023-03-24
Payer: COMMERCIAL

## 2023-03-24 VITALS
SYSTOLIC BLOOD PRESSURE: 104 MMHG | WEIGHT: 145.25 LBS | HEART RATE: 74 BPM | DIASTOLIC BLOOD PRESSURE: 60 MMHG | OXYGEN SATURATION: 99 % | TEMPERATURE: 98 F | BODY MASS INDEX: 26.06 KG/M2 | RESPIRATION RATE: 16 BRPM | HEIGHT: 62.68 IN

## 2023-03-24 DIAGNOSIS — R10.13 EPIGASTRIC ABDOMINAL PAIN: Primary | ICD-10-CM

## 2023-03-24 DIAGNOSIS — R11.11 VOMITING WITHOUT NAUSEA, UNSPECIFIED VOMITING TYPE: ICD-10-CM

## 2023-03-24 DIAGNOSIS — K21.9 GASTROESOPHAGEAL REFLUX DISEASE, UNSPECIFIED WHETHER ESOPHAGITIS PRESENT: ICD-10-CM

## 2023-03-24 DIAGNOSIS — S01.81XA LACERATION OF SKIN OF FACE, INITIAL ENCOUNTER: ICD-10-CM

## 2023-03-24 PROCEDURE — 99214 OFFICE O/P EST MOD 30 MIN: CPT | Performed by: FAMILY MEDICINE

## 2023-03-24 PROCEDURE — 3078F DIAST BP <80 MM HG: CPT | Performed by: FAMILY MEDICINE

## 2023-03-24 PROCEDURE — 3074F SYST BP LT 130 MM HG: CPT | Performed by: FAMILY MEDICINE

## 2023-03-24 PROCEDURE — 3008F BODY MASS INDEX DOCD: CPT | Performed by: FAMILY MEDICINE

## 2023-03-24 RX ORDER — LEVOTHYROXINE SODIUM 88 UG/1
88 TABLET ORAL
COMMUNITY

## 2023-03-24 RX ORDER — PANTOPRAZOLE SODIUM 40 MG/1
40 TABLET, DELAYED RELEASE ORAL
Qty: 30 TABLET | Refills: 0 | Status: SHIPPED | OUTPATIENT
Start: 2023-03-24 | End: 2023-04-23

## 2023-03-24 RX ORDER — CHOLECALCIFEROL (VITAMIN D3) 50 MCG
TABLET ORAL
COMMUNITY

## 2023-03-27 ENCOUNTER — TELEPHONE (OUTPATIENT)
Dept: FAMILY MEDICINE CLINIC | Facility: CLINIC | Age: 43
End: 2023-03-27

## 2023-03-27 LAB — RESULT:: NOT DETECTED

## 2023-03-27 NOTE — TELEPHONE ENCOUNTER
Called patient and informed of negative H Pylori test and instruction per Dr. Kade Edmondson to start Pantoprazole. She is asking if she should go ahead with the [address]. Advised she should get US completed because that will help determine what may be causing her symptoms. Verbalizes understanding, has appt next week.

## 2023-03-27 NOTE — TELEPHONE ENCOUNTER
Pt would like to know her lab result and if she should start her medication. Dr wanted to wait for her lab result. Please advise.

## 2023-03-31 ENCOUNTER — OFFICE VISIT (OUTPATIENT)
Dept: FAMILY MEDICINE CLINIC | Facility: CLINIC | Age: 43
End: 2023-03-31
Payer: COMMERCIAL

## 2023-03-31 VITALS
RESPIRATION RATE: 18 BRPM | HEIGHT: 62.68 IN | WEIGHT: 145 LBS | DIASTOLIC BLOOD PRESSURE: 60 MMHG | HEART RATE: 74 BPM | OXYGEN SATURATION: 98 % | BODY MASS INDEX: 26.02 KG/M2 | SYSTOLIC BLOOD PRESSURE: 100 MMHG | TEMPERATURE: 98 F

## 2023-03-31 DIAGNOSIS — S01.81XD FACIAL LACERATION, SUBSEQUENT ENCOUNTER: ICD-10-CM

## 2023-03-31 DIAGNOSIS — Z48.02 ENCOUNTER FOR REMOVAL OF SUTURES: Primary | ICD-10-CM

## 2023-03-31 PROCEDURE — 3008F BODY MASS INDEX DOCD: CPT | Performed by: FAMILY MEDICINE

## 2023-03-31 PROCEDURE — 3078F DIAST BP <80 MM HG: CPT | Performed by: FAMILY MEDICINE

## 2023-03-31 PROCEDURE — 3074F SYST BP LT 130 MM HG: CPT | Performed by: FAMILY MEDICINE

## 2023-03-31 PROCEDURE — 99024 POSTOP FOLLOW-UP VISIT: CPT | Performed by: FAMILY MEDICINE

## 2023-03-31 RX ORDER — SULFAMETHOXAZOLE AND TRIMETHOPRIM 800; 160 MG/1; MG/1
1 TABLET ORAL 2 TIMES DAILY
COMMUNITY
Start: 2023-03-25

## 2023-03-31 RX ORDER — NAPROXEN 500 MG/1
TABLET ORAL
COMMUNITY
Start: 2023-03-25

## 2023-04-25 ENCOUNTER — OFFICE VISIT (OUTPATIENT)
Dept: FAMILY MEDICINE CLINIC | Facility: CLINIC | Age: 43
End: 2023-04-25
Payer: COMMERCIAL

## 2023-04-25 VITALS
TEMPERATURE: 98 F | BODY MASS INDEX: 25.66 KG/M2 | HEART RATE: 70 BPM | RESPIRATION RATE: 18 BRPM | DIASTOLIC BLOOD PRESSURE: 66 MMHG | HEIGHT: 62.68 IN | WEIGHT: 143 LBS | OXYGEN SATURATION: 98 % | SYSTOLIC BLOOD PRESSURE: 102 MMHG

## 2023-04-25 DIAGNOSIS — R63.0 DECREASED APPETITE: ICD-10-CM

## 2023-04-25 DIAGNOSIS — K81.9 CHOLECYSTITIS: ICD-10-CM

## 2023-04-25 DIAGNOSIS — R19.8 ABDOMINAL FULLNESS: Primary | ICD-10-CM

## 2023-04-25 PROCEDURE — 3074F SYST BP LT 130 MM HG: CPT | Performed by: FAMILY MEDICINE

## 2023-04-25 PROCEDURE — 3008F BODY MASS INDEX DOCD: CPT | Performed by: FAMILY MEDICINE

## 2023-04-25 PROCEDURE — 99213 OFFICE O/P EST LOW 20 MIN: CPT | Performed by: FAMILY MEDICINE

## 2023-04-25 PROCEDURE — 3078F DIAST BP <80 MM HG: CPT | Performed by: FAMILY MEDICINE

## 2023-04-25 RX ORDER — PANTOPRAZOLE SODIUM 40 MG/10ML
INJECTION, POWDER, LYOPHILIZED, FOR SOLUTION INTRAVENOUS
COMMUNITY
Start: 2023-03-24

## 2023-07-06 ENCOUNTER — TELEPHONE (OUTPATIENT)
Dept: FAMILY MEDICINE CLINIC | Facility: CLINIC | Age: 43
End: 2023-07-06

## 2023-07-06 NOTE — TELEPHONE ENCOUNTER
Rep from [facility] medical called said pt called them to schedule follow up on ultrasound of abdomen and they do not have orders, pt scheduled for 7/17/23

## 2023-07-06 NOTE — TELEPHONE ENCOUNTER
Called pt to inform f/u on abd US request as PCP had referred pt to Surg and Gastro. Pt indicated has tried cleansing remedies, life style and diet changes, and prefers to recheck abd US to see if this helped with gallstones before seeing specialist.   Informed will relay to PCP for further recommendations. No further questions or concerns. Pt verbalized understanding and agreed with POC. Please advise. Thank you.

## 2023-07-07 NOTE — TELEPHONE ENCOUNTER
Had long discussion with both patient and her spouse explaining that there is no new diagnosis, symptoms or clinical reason to repeat an US. Advised if patient is feeling better with the changes she has made, that is great, but not a reason to do an US. It would be considered unnecessary testing. They appropriate POC is to follow up with the specialists patient has been referred to. They are the ones who would determine what if any other testing or change in POC is necessary. He is asking how they would know if the stones are better. Advised if the symptoms are better or gone, there is not medical reason to repeat an US. That does not mean that the symptoms cannot return. Agreed it would be interesting to see if there is a change in the gall stones on US, but it does not change any part of the POC and is therefore medically unnecessary. Advised to follow through with referral to specialist or they can schedule a visit with Dr. Odessa Medeiros to discuss further. Advised I will update Dr. Odessa Medeiros with our conversation.     Dr. Av Torres

## 2023-07-07 NOTE — TELEPHONE ENCOUNTER
Spoke to patient, she reports she is on ayurvedic medicine course and diet and would like to know if there has been improvement. Since she would like to avoid Surgery is the reason she wants to do an US. Please advise.

## 2023-07-07 NOTE — TELEPHONE ENCOUNTER
Would not recommend US at this time, has only been 2 months since her last one and if she is not symptomatic no indication for repeat US.

## 2024-01-17 ENCOUNTER — OFFICE VISIT (OUTPATIENT)
Facility: LOCATION | Age: 44
End: 2024-01-17
Payer: COMMERCIAL

## 2024-01-17 VITALS — HEART RATE: 66 BPM | TEMPERATURE: 98 F

## 2024-01-17 DIAGNOSIS — K80.10 CALCULUS OF GALLBLADDER WITH CHRONIC CHOLECYSTITIS WITHOUT OBSTRUCTION: Primary | ICD-10-CM

## 2024-01-17 PROCEDURE — 99204 OFFICE O/P NEW MOD 45 MIN: CPT | Performed by: SURGERY

## 2024-01-17 NOTE — H&P
New Patient Visit Note       Active Problems      1. Calculus of gallbladder with chronic cholecystitis without obstruction        Chief Complaint   Chief Complaint   Patient presents with    New Patient     NP - Gall stones, ref by Dr. Sanders, - US EXAM ABDOM COMPLETE 23 Pt. States to get occasional pain that comes and goes. Pt. States some foods increase nausea. Pt. States to have episodes patient becomes very nauseous and vomits. Pt. States episodes occur 1 a month and are random.          History of Present Illness   The patient is a 44-year-old female seen at the request of her primary care physician regarding chronic cholecystitis with cholelithiasis.  The patient states she will have times when she eats.  She will then develop gassy pain.  If she develops the pain, burping will then make her feel better.  She will have times of nausea and vomiting.  The pain is in her epigastrium and has now moved slightly inferiorly.  The pain will happen intermittently and randomly.  It is not on a daily or weekly basis.  When she is in pain, she feels chilled but denies fevers.  There are other times when she will go weeks without pain.  She has had a prior ultrasound that shows gallstones.  Her  states that the episodes seem to be less frequent.      Allergies  Raymond has No Known Allergies.    Past Medical / Surgical / Social / Family History    The past medical and past surgical history have been reviewed by me today.    Past Medical History:   Diagnosis Date    Hypothyroidism      Past Surgical History:   Procedure Laterality Date      [facility] [place] [place]        The family history and social history have been reviewed by me today.    Family History   Problem Relation Age of Onset    Thyroid Disorder Father     Hypertension Mother      Social History     Socioeconomic History    Marital status:      Spouse name: Becka    Number of children: 2    Highest education  level: Master's degree (e.g., MA, MS, Katarzyna, MEd, MSW, JAGJIT)   Occupational History    Occupation: homemaker   Tobacco Use    Smoking status: Never    Smokeless tobacco: Never   Vaping Use    Vaping Use: Never used   Substance and Sexual Activity    Alcohol use: No     Alcohol/week: 0.0 standard drinks of alcohol    Drug use: No    Sexual activity: Yes     Partners: Male     Birth control/protection: Rhythm   Other Topics Concern    Caffeine Concern Yes     Comment: tea - 2 cups per day    Exercise Yes     Comment: 2x in a week - walking    Seat Belt Yes        Current Outpatient Medications:     pantoprazole 40 MG Intravenous Recon Soln, , Disp: , Rfl:     levothyroxine 88 MCG Oral Tab, Take 1 tablet (88 mcg total) by mouth before breakfast., Disp: , Rfl:     Cholecalciferol (VITAMIN D) 50 MCG (2000 UT) Oral Tab, Take by mouth., Disp: , Rfl:       Review of Systems  The Review of Systems has been reviewed by me during today.  Review of Systems   Constitutional: Negative.    HENT: Negative.     Eyes: Negative.    Respiratory: Negative.     Cardiovascular: Negative.    Gastrointestinal: Negative.    Genitourinary: Negative.    Musculoskeletal: Negative.    Skin: Negative.    Neurological: Negative.    Psychiatric/Behavioral: Negative.         Physical Findings   Pulse 66   Temp 98.2 °F (36.8 °C) (Temporal)   LMP 04/13/2023 (Exact Date)   Physical Exam  Vitals and nursing note reviewed.   Constitutional:       General: She is not in acute distress.     Appearance: Normal appearance. She is well-developed.   HENT:      Head: Normocephalic and atraumatic.   Eyes:      General: No scleral icterus.     Conjunctiva/sclera: Conjunctivae normal.   Neck:      Trachea: No tracheal deviation.   Cardiovascular:      Rate and Rhythm: Normal rate and regular rhythm.      Heart sounds: S1 normal and S2 normal. No murmur heard.  Pulmonary:      Effort: No accessory muscle usage or respiratory distress.      Breath sounds: No  decreased breath sounds, wheezing, rhonchi or rales.   Abdominal:      General: There is no distension or abdominal bruit.      Palpations: Abdomen is soft. Abdomen is not rigid. There is no shifting dullness, fluid wave, hepatomegaly, splenomegaly, mass or pulsatile mass.      Tenderness: There is no abdominal tenderness. There is no guarding or rebound. Negative signs include Lazaro's sign and McBurney's sign.      Hernia: There is no hernia in the umbilical area or ventral area.   Skin:     General: Skin is warm and dry.   Neurological:      Mental Status: She is alert and oriented to person, place, and time.   Psychiatric:         Speech: Speech normal.         Behavior: Behavior normal.         Thought Content: Thought content normal.         Judgment: Judgment normal.           Abdominal ultrasound   April 5, 2023   I personally reviewed the report.     FINDINGS: Multiple echogenic structures are identified within the gallbladder measuring 1.1 cm and smaller and demonstrating mobility with change in patient position consistent with gallstones. The gallbladder is normal in size without wall thickening.   No pericholecystic fluid collections are identified. No sonographic Lazaro sign elicited during the exam. The common bile duct is normal caliber measuring 0.3 cm diameter. No bile duct dilatation is identified.     The liver and spleen are normal in size and morphology and demonstrate normal parenchymal echogenicity. Hepatic length measures 15.4 cm. Splenic length measures 9.0 cm. Hepatopetal flow is demonstrated in the main portal vein. The pancreas is normal in   size and morphology and demonstrates normal parenchymal echogenicity, although distal pancreatic tail evaluation is limited by overlying bowel gas.     Bilateral kidneys are normal in size and morphology and demonstrate normal parenchymal echogenicity. Bilateral renal lengths measure 11.9 cm. No hydronephrosis. No evidence of renal calculi or  perinephric fluid collection.     The abdominal aorta is patent and normal caliber. Intraluminal color flow is identified within a patent inferior vena cava.     No abdominal free fluid is identified.     IMPRESSION:   1. Cholelithiasis without evidence of cholecystitis or bile duct dilatation.     2. No sonographic abnormalities are identified in the liver, spleen, pancreas, kidneys, or abdominal aorta. No abdominal ascites.     Interpreting Radiologist:     Leland Stanley M.D.   Electronically Signed: 04/05/2023     Assessment   1. Calculus of gallbladder with chronic cholecystitis without obstruction          Plan   I offered the patient laparoscopic cholecystectomy with intraoperative cholangiogram.  The risks, benefits, and alternatives including injury to the common bile duct, injury to intraabdominal contents, bleeding from the liver, post-operative diarrhea and shoulder pain were discussed.  The patient's  is requesting a repeat ultrasound.  I discussed that it does not matter if she has 1 or 10 gallstones.  Her symptoms can be attributed to her gallstones, regardless of the number.  The patient's  would still like to have another ultrasound.  The ultrasound was ordered, and I will reach out to them with the results.  I discussed in detail that the patient's symptoms, though intermittent, can be attributed to her gallbladder.  Gallbladder disease is tricky in that way.  Sometimes, you can eat a fatty meal and feel fine.  Other times, you can have symptoms.  I stressed that I would still recommend proceeding with gallbladder surgery.  In the meantime, I provided her handout regarding low-fat diets.  The patient and her  will consider her options.      Imaging & Referrals   US ABDOMEN COMPLETE ([id number])      Shelly Merino MD

## 2024-01-19 ENCOUNTER — OFFICE VISIT (OUTPATIENT)
Dept: FAMILY MEDICINE CLINIC | Facility: CLINIC | Age: 44
End: 2024-01-19
Payer: COMMERCIAL

## 2024-01-19 VITALS
OXYGEN SATURATION: 98 % | BODY MASS INDEX: 24.58 KG/M2 | HEART RATE: 80 BPM | SYSTOLIC BLOOD PRESSURE: 98 MMHG | TEMPERATURE: 98 F | DIASTOLIC BLOOD PRESSURE: 62 MMHG | RESPIRATION RATE: 18 BRPM | WEIGHT: 137 LBS | HEIGHT: 62.68 IN

## 2024-01-19 DIAGNOSIS — E03.9 HYPOTHYROIDISM, UNSPECIFIED TYPE: ICD-10-CM

## 2024-01-19 DIAGNOSIS — Z00.00 ROUTINE GENERAL MEDICAL EXAMINATION AT A HEALTH CARE FACILITY: Primary | ICD-10-CM

## 2024-01-19 DIAGNOSIS — E55.9 VITAMIN D DEFICIENCY: ICD-10-CM

## 2024-01-19 DIAGNOSIS — Z12.31 VISIT FOR SCREENING MAMMOGRAM: ICD-10-CM

## 2024-01-19 PROCEDURE — 3078F DIAST BP <80 MM HG: CPT | Performed by: FAMILY MEDICINE

## 2024-01-19 PROCEDURE — 3074F SYST BP LT 130 MM HG: CPT | Performed by: FAMILY MEDICINE

## 2024-01-19 PROCEDURE — 3008F BODY MASS INDEX DOCD: CPT | Performed by: FAMILY MEDICINE

## 2024-01-19 PROCEDURE — 99396 PREV VISIT EST AGE 40-64: CPT | Performed by: FAMILY MEDICINE

## 2024-01-19 NOTE — PROGRESS NOTES
Raymond Luis is a 44 year old female.  No chief complaint on file.    HPI:   Raymond Luis is a 44 year old female with history of hypothyroidism seen for her annual physical.  Pap done in  was normal, periods are regular and not heavy.  Does not do breast self exam, mammogram done in  was normal, no family history of breast cancer.    Healthy diet and tries to exercise regularly.    Complaining of big toenail avulsion, no trauma, no pain.    Compliant with her thyroid medication, takes levothyroxine 88 mcg that she gets from [facility].    PAST MEDICAL HISTORY:     Past Medical History:   Diagnosis Date    Hypothyroidism      PAST SURGICAL HISTORY:     Past Surgical History:   Procedure Laterality Date      2016    [facility] [place], [place]      ALLERGY:   No Known Allergies  MEDICATIONS:     Current Outpatient Medications   Medication Sig Dispense Refill    levothyroxine 88 MCG Oral Tab Take 1 tablet (88 mcg total) by mouth before breakfast.      Cholecalciferol (VITAMIN D) 50 MCG (2000 UT) Oral Tab Take by mouth.       FAMILY HISTORY:     Family History   Problem Relation Age of Onset    Thyroid Disorder Father     Hypertension Mother      SOCIAL HISTORY:     Social History     Socioeconomic History    Marital status:      Spouse name: Becka    Number of children: 2    Highest education level: Master's degree (e.g., MA, MS, Katarzyna, MEd, MSW, JAGJIT)   Occupational History    Occupation: homemaker   Tobacco Use    Smoking status: Never    Smokeless tobacco: Never   Vaping Use    Vaping Use: Never used   Substance and Sexual Activity    Alcohol use: No     Alcohol/week: 0.0 standard drinks of alcohol    Drug use: No    Sexual activity: Yes     Partners: Male     Birth control/protection: Rhythm   Other Topics Concern    Caffeine Concern Yes     Comment: tea - 2 cups per day    Exercise Yes     Comment: 2x in a week - walking    Seat Belt Yes   Social History  Narrative    Balanced diet.     REVIEW OF SYSTEMS:   Review of Systems   Constitutional:  Negative for appetite change, fatigue, fever and unexpected weight change.   HENT:  Negative for congestion, ear pain, hearing loss and sore throat.    Eyes:  Negative for discharge, redness and visual disturbance.   Respiratory:  Negative for cough, chest tightness and shortness of breath.    Cardiovascular:  Negative for chest pain and palpitations.   Gastrointestinal:  Negative for abdominal pain, blood in stool, constipation and nausea.   Endocrine: Negative for cold intolerance, heat intolerance and polyuria.   Genitourinary:  Negative for difficulty urinating, dysuria, frequency and urgency.   Musculoskeletal:  Negative for arthralgias, gait problem, joint swelling and myalgias.   Skin:  Negative for rash.   Allergic/Immunologic: Negative for food allergies.   Neurological:  Negative for dizziness, weakness, numbness and headaches.   Hematological:  Negative for adenopathy. Does not bruise/bleed easily.   Psychiatric/Behavioral:  Negative for dysphoric mood and sleep disturbance. The patient is not nervous/anxious.         PHYSICAL EXAM:   BP 98/62   Pulse 80   Temp 98 °F (36.7 °C) (Temporal)   Resp 18   Ht 5' 2.68\" (1.592 m)   Wt 137 lb (62.1 kg)   LMP 04/13/2023 (Exact Date)   SpO2 98%   BMI 24.52 kg/m²     Physical Exam  Constitutional:       General: She is not in acute distress.     Appearance: Normal appearance. She is well-developed and normal weight.   HENT:      Head: Normocephalic and atraumatic.      Right Ear: Tympanic membrane, ear canal and external ear normal.      Left Ear: Tympanic membrane, ear canal and external ear normal.      Nose: Nose normal.      Mouth/Throat:      Mouth: Mucous membranes are moist.      Pharynx: Oropharynx is clear.   Eyes:      Extraocular Movements: Extraocular movements intact.      Conjunctiva/sclera: Conjunctivae normal.      Pupils: Pupils are equal, round, and  reactive to light.   Neck:      Thyroid: No thyromegaly.      Vascular: No carotid bruit.   Cardiovascular:      Rate and Rhythm: Normal rate and regular rhythm.      Pulses: Normal pulses.      Heart sounds: Normal heart sounds. No murmur heard.  Pulmonary:      Effort: Pulmonary effort is normal. No respiratory distress.      Breath sounds: Normal breath sounds.   Chest:      Chest wall: No tenderness.   Breasts:     Right: Normal. No swelling, inverted nipple, mass, nipple discharge, skin change or tenderness.      Left: Normal. No swelling, inverted nipple, mass, nipple discharge, skin change or tenderness.   Abdominal:      General: Bowel sounds are normal. There is no distension.      Palpations: Abdomen is soft. There is no hepatomegaly, splenomegaly or mass.      Tenderness: There is no abdominal tenderness.      Hernia: No hernia is present.   Musculoskeletal:         General: Normal range of motion.      Cervical back: Normal range of motion and neck supple.      Right lower leg: No edema.      Left lower leg: No edema.   Lymphadenopathy:      Cervical: No cervical adenopathy.      Upper Body:      Right upper body: No supraclavicular, axillary or pectoral adenopathy.      Left upper body: No supraclavicular, axillary or pectoral adenopathy.   Skin:     General: Skin is warm.      Findings: No rash.   Neurological:      General: No focal deficit present.      Mental Status: She is alert and oriented to person, place, and time.      Deep Tendon Reflexes: Reflexes are normal and symmetric.   Psychiatric:         Mood and Affect: Mood normal.         Behavior: Behavior normal.         Thought Content: Thought content normal.         Judgment: Judgment normal.       ASSESSMENT AND PLAN:   Raymond was seen today for physical.    Diagnoses and all orders for this visit:    Routine general medical examination at a health care facility  -     Assay, Thyroid Stim Hormone  -     Lipid Panel  -     Comp Metabolic  Panel (14)  -     CBC With Differential With Platelet    Visit for screening mammogram  -     3D Mammogram Digital Screen, Bilateral (CPT=77067/93620); Future    Hypothyroidism, unspecified type  -     T4 FREE [866] [Q]    Vitamin D deficiency  -     VITAMIN D, 25-HYDROXY [38545][Q]    Age appropriate anticipatory guidance reviewed  Health Maintenance   Topic Date Due    COVID-19 Vaccine (5 - 2023-24 season) 09/01/2023    Annual Physical  Done today    Mammogram  12/20/2023    Pap Smear  11/15/2025    DTaP,Tdap,and Td Vaccines (3 - Td or Tdap) 03/22/2033    Influenza Vaccine  Completed    Annual Depression Screening  Completed    Pneumococcal Vaccine: Birth to 64yrs  Aged Out        The 21st Century Cures Act makes medical notes like these available to patients in the interest of transparency. Please be advised this is a medical document. Medical documents are intended to carry relevant information, facts as evident, and the clinical opinion of the practitioner. The medical note is intended as peer to peer communication and may appear blunt or direct. It is written in medical language and may contain abbreviations or verbiage that are unfamiliar.

## 2024-01-21 LAB
ABSOLUTE BASOPHILS: 10 CELLS/UL (ref 0–200)
ABSOLUTE EOSINOPHILS: 130 CELLS/UL (ref 15–500)
ABSOLUTE LYMPHOCYTES: 2098 CELLS/UL (ref 850–3900)
ABSOLUTE MONOCYTES: 331 CELLS/UL (ref 200–950)
ABSOLUTE NEUTROPHILS: 2232 CELLS/UL (ref 1500–7800)
ALBUMIN/GLOBULIN RATIO: 1.7 (CALC) (ref 1–2.5)
ALBUMIN: 4.2 G/DL (ref 3.6–5.1)
ALKALINE PHOSPHATASE: 63 U/L (ref 31–125)
ALT: 20 U/L (ref 6–29)
AST: 18 U/L (ref 10–30)
BASOPHILS: 0.2 %
BILIRUBIN, TOTAL: 0.7 MG/DL (ref 0.2–1.2)
BUN: 9 MG/DL (ref 7–25)
CALCIUM: 9.4 MG/DL (ref 8.6–10.2)
CARBON DIOXIDE: 27 MMOL/L (ref 20–32)
CHLORIDE: 104 MMOL/L (ref 98–110)
CHOL/HDLC RATIO: 3.4 (CALC)
CHOLESTEROL, TOTAL: 177 MG/DL
CREATININE: 0.61 MG/DL (ref 0.5–0.99)
EGFR: 114 ML/MIN/1.73M2
EOSINOPHILS: 2.7 %
GLOBULIN: 2.5 G/DL (CALC) (ref 1.9–3.7)
GLUCOSE: 93 MG/DL (ref 65–99)
HDL CHOLESTEROL: 52 MG/DL
HEMATOCRIT: 40.6 % (ref 35–45)
HEMOGLOBIN: 13.5 G/DL (ref 11.7–15.5)
LDL-CHOLESTEROL: 106 MG/DL (CALC)
LYMPHOCYTES: 43.7 %
MCH: 29.9 PG (ref 27–33)
MCHC: 33.3 G/DL (ref 32–36)
MCV: 89.8 FL (ref 80–100)
MONOCYTES: 6.9 %
MPV: 11.5 FL (ref 7.5–12.5)
NEUTROPHILS: 46.5 %
NON-HDL CHOLESTEROL: 125 MG/DL (CALC)
PLATELET COUNT: 244 THOUSAND/UL (ref 140–400)
POTASSIUM: 4.5 MMOL/L (ref 3.5–5.3)
PROTEIN, TOTAL: 6.7 G/DL (ref 6.1–8.1)
RDW: 11.9 % (ref 11–15)
RED BLOOD CELL COUNT: 4.52 MILLION/UL (ref 3.8–5.1)
SODIUM: 137 MMOL/L (ref 135–146)
T4, FREE: 1.4 NG/DL (ref 0.8–1.8)
TRIGLYCERIDES: 96 MG/DL
TSH: 3.37 MIU/L
VITAMIN D, 25-OH, TOTAL: 26 NG/ML (ref 30–100)
WHITE BLOOD CELL COUNT: 4.8 THOUSAND/UL (ref 3.8–10.8)

## 2024-01-26 ENCOUNTER — TELEPHONE (OUTPATIENT)
Dept: FAMILY MEDICINE CLINIC | Facility: CLINIC | Age: 44
End: 2024-01-26

## 2024-01-26 NOTE — TELEPHONE ENCOUNTER
Raymond,     You have vitamin D insufficiency, I have sent a prescription for 1 month, continue to take over the counter vitamin D 2000IU daily  LDL cholesterol is minimally elevated.  Thyroid function controlled and cbc and cmp are normal.   Written by Cathy Sanders MD on 1/24/2024  1:44 PM CST    Called patient and Informed of lab result note per Dr. Sanders. answered questions regarding Vitamin D rx.  No further questions.

## 2024-01-26 NOTE — TELEPHONE ENCOUNTER
Spoke to pt who currently take Vitamin D 2000 units over-the-counter.  Pt wants to know which one should she take and what's the difference.      Pt received a call from the pharmacy that the following medication for Vitamin D was ready:    - ergocalciferol 1.25 MG (42737 UT) Oral Cap

## 2024-02-20 ENCOUNTER — TELEPHONE (OUTPATIENT)
Facility: LOCATION | Age: 44
End: 2024-02-20

## 2024-02-20 NOTE — TELEPHONE ENCOUNTER
Juanjose from [facility] called to request the US order.     Printed and faxed to [phone]

## 2024-03-20 ENCOUNTER — OFFICE VISIT (OUTPATIENT)
Dept: FAMILY MEDICINE CLINIC | Facility: CLINIC | Age: 44
End: 2024-03-20
Payer: COMMERCIAL

## 2024-03-20 VITALS
HEIGHT: 62.68 IN | HEART RATE: 72 BPM | BODY MASS INDEX: 24.4 KG/M2 | WEIGHT: 136 LBS | OXYGEN SATURATION: 99 % | SYSTOLIC BLOOD PRESSURE: 110 MMHG | DIASTOLIC BLOOD PRESSURE: 70 MMHG | TEMPERATURE: 99 F

## 2024-03-20 DIAGNOSIS — Z71.84 TRAVEL ADVICE ENCOUNTER: Primary | ICD-10-CM

## 2024-03-20 DIAGNOSIS — L60.9 NAIL ABNORMALITY: ICD-10-CM

## 2024-03-20 PROCEDURE — 99213 OFFICE O/P EST LOW 20 MIN: CPT | Performed by: FAMILY MEDICINE

## 2024-03-20 RX ORDER — MEDROXYPROGESTERONE ACETATE 10 MG/1
10 TABLET ORAL DAILY
Qty: 10 TABLET | Refills: 0 | Status: SHIPPED | OUTPATIENT
Start: 2024-03-20 | End: 2024-03-30

## 2024-03-20 NOTE — PROGRESS NOTES
Raymond Luis is a 44 year old female.  Chief Complaint   Patient presents with    Finger Pain      R hand middle finger pain,    Menstrual Problem     Travel meds,     HPI:   Raymond Luis is a 44 year old female with history of hypothyroidism seen today to discuss medication to delay her menstrual cycle.  Is going on vacation end of this month and her menstrual cycle is due on 3/28.    Patient complaining of pain and inflammation at the base of her right hand middle finger last month and now states the nail is  has some discomfort when she uses her finger.  Denies any drainage.    ALLERGY:   No Known Allergies    MEDICATIONS:     Current Outpatient Medications   Medication Sig Dispense Refill    levothyroxine 88 MCG Oral Tab Take 1 tablet (88 mcg total) by mouth before breakfast.      Cholecalciferol (VITAMIN D) 50 MCG (2000 UT) Oral Tab Take by mouth.        Past Medical History:   Diagnosis Date    Hypothyroidism       Social History:  Social History     Socioeconomic History    Marital status:      Spouse name: Becka    Number of children: 2    Highest education level: Master's degree (e.g., MA, MS, Katarzyna, MEd, MSW, JAGJIT)   Occupational History    Occupation: homemaker   Tobacco Use    Smoking status: Never    Smokeless tobacco: Never   Vaping Use    Vaping Use: Never used   Substance and Sexual Activity    Alcohol use: No     Alcohol/week: 0.0 standard drinks of alcohol    Drug use: No    Sexual activity: Yes     Partners: Male     Birth control/protection: Rhythm   Other Topics Concern    Caffeine Concern Yes     Comment: tea - 2 cups per day    Exercise Yes     Comment: 2x in a week - walking    Seat Belt Yes   Social History Narrative    Balanced diet.        REVIEW OF SYSTEMS:   A comprehensive 10 point review of systems was completed.  Pertinent positives and negatives noted in the the HPI.      EXAM:   /70   Pulse 72   Temp 99.2 °F (37.3 °C)  (Temporal)   Ht 5' 2.68\" (1.592 m)   Wt 136 lb (61.7 kg)   LMP 02/28/2024 (Exact Date)   SpO2 99%   BMI 24.34 kg/m²   GENERAL: well developed, well nourished,in no apparent distress  SKIN: Right hand middle finger nail  from nail bed, no tenderness to palpation.  LUNGS: clear to auscultation  CARDIO: RRR without murmur    ASSESSMENT AND PLAN:   Raymond was seen today for finger pain and menstrual problem.    Diagnoses and all orders for this visit:    Travel advice encounter  -     medroxyPROGESTERone Acetate 10 MG Oral Tab; Take 1 tablet (10 mg total) by mouth daily for 10 days. For 10 days  -     Advised to start at least 5 to 7 days before her period is supposed to come.    Nail abnormality  -     VITAMIN B12 [927][Q]  -     Reassured patient likely had paronychia, should regrow her nail once the current nail falls off.  Advised if she does not notice new nail growth can follow-up.       The 21st Century Cures Act makes medical notes like these available to patients in the interest of transparency. Please be advised this is a medical document. Medical documents are intended to carry relevant information, facts as evident, and the clinical opinion of the practitioner. The medical note is intended as peer to peer communication and may appear blunt or direct. It is written in medical language and may contain abbreviations or verbiage that are unfamiliar.

## 2025-02-07 ENCOUNTER — TELEPHONE (OUTPATIENT)
Dept: FAMILY MEDICINE CLINIC | Facility: CLINIC | Age: 45
End: 2025-02-07

## 2025-02-07 DIAGNOSIS — Z12.31 ENCOUNTER FOR SCREENING MAMMOGRAM FOR MALIGNANT NEOPLASM OF BREAST: Primary | ICD-10-CM

## 2025-02-07 NOTE — TELEPHONE ENCOUNTER
Last mammo 12/12/22, additonal views 12/20/22:     Impression   CONCLUSION:       BI-RADS CATEGORY:    DIAGNOSTIC CATEGORY 1--NEGATIVE ASSESSMENT.       RECOMMENDATIONS:    ROUTINE MAMMOGRAM AND CLINICAL EVALUATION IN 12 MONTHS.         LOV 3/20/24. Order placed per protocol.

## 2025-02-07 NOTE — TELEPHONE ENCOUNTER
Patient called scheduled her physical for 5-7-25.  Patient asked for the mammogram orders be entered priorto that appointment.

## 2025-02-19 ENCOUNTER — HOSPITAL ENCOUNTER (OUTPATIENT)
Dept: MAMMOGRAPHY | Age: 45
Discharge: HOME | End: 2025-02-19
Attending: FAMILY MEDICINE
Payer: COMMERCIAL

## 2025-02-19 DIAGNOSIS — Z12.31 ENCOUNTER FOR SCREENING MAMMOGRAM FOR MALIGNANT NEOPLASM OF BREAST: ICD-10-CM

## 2025-02-19 PROCEDURE — 77063 BREAST TOMOSYNTHESIS BI: CPT | Performed by: FAMILY MEDICINE

## 2025-02-19 PROCEDURE — 77067 SCR MAMMO BI INCL CAD: CPT | Performed by: FAMILY MEDICINE

## 2025-05-07 ENCOUNTER — OFFICE VISIT (OUTPATIENT)
Dept: FAMILY MEDICINE CLINIC | Facility: CLINIC | Age: 45
End: 2025-05-07
Payer: COMMERCIAL

## 2025-05-07 VITALS
HEART RATE: 72 BPM | OXYGEN SATURATION: 98 % | RESPIRATION RATE: 18 BRPM | HEIGHT: 62.68 IN | TEMPERATURE: 97 F | DIASTOLIC BLOOD PRESSURE: 78 MMHG | BODY MASS INDEX: 25.66 KG/M2 | WEIGHT: 143 LBS | SYSTOLIC BLOOD PRESSURE: 120 MMHG

## 2025-05-07 DIAGNOSIS — E55.9 VITAMIN D DEFICIENCY: ICD-10-CM

## 2025-05-07 DIAGNOSIS — Z00.00 ROUTINE GENERAL MEDICAL EXAMINATION AT A HEALTH CARE FACILITY: Primary | ICD-10-CM

## 2025-05-07 DIAGNOSIS — Z13.1 SCREENING FOR DIABETES MELLITUS: ICD-10-CM

## 2025-05-07 DIAGNOSIS — R92.30 DENSE BREAST TISSUE: ICD-10-CM

## 2025-05-07 DIAGNOSIS — E03.9 HYPOTHYROIDISM, UNSPECIFIED TYPE: ICD-10-CM

## 2025-05-07 DIAGNOSIS — L60.9 NAIL ABNORMALITY: ICD-10-CM

## 2025-05-07 PROCEDURE — 99396 PREV VISIT EST AGE 40-64: CPT | Performed by: FAMILY MEDICINE

## 2025-05-07 NOTE — PROGRESS NOTES
Family Medicine Progress Note    Raymond Luis is a 45 year old female.  ASSESSMENT AND PLAN:  Raymond was seen today for physical and other.    Diagnoses and all orders for this visit:    Routine general medical examination at a health care facility  -     CBC With Differential With Platelet  -     Comp Metabolic Panel (14)  -     Lipid Panel    Dense breast tissue  Dense breast tissue noted on mammogram. Discussed implications and additional imaging options. Advised checking insurance coverage for ultrasound.  - Order mammogram and ultrasound for next year.  - Advise her to confirm insurance coverage for ultrasound.    Vitamin D deficiency  -     VITAMIN D, 25-HYDROXY [77647][Q]    Nail abnormality  -     VITAMIN B12 [927][Q]    Hypothyroidism, unspecified type  -     Free T4, (Free Thyroxine)  -     Assay, Thyroid Stim Hormone    Screening for diabetes mellitus  -     HGB A1C [496] [Q]      Age appropriate anticipatory guidance reviewed  Health Maintenance   Topic Date Due    Annual Physical  01/19/2025    COVID-19 Vaccine (5 - 2024-25 season) 06/07/2025 (Originally 9/1/2024)    Influenza Vaccine (Season Ended) 10/01/2025    Pap Smear  11/15/2025    Mammogram  02/19/2026    DTaP,Tdap,and Td Vaccines (3 - Td or Tdap) 03/22/2033    Annual Depression Screening  Completed    Pneumococcal Vaccine: Birth to 50yrs  Aged Out    Meningococcal B Vaccine  Aged Out       The patient indicates understanding of these issues and agrees to the plan.  Follow-Up: The patient is asked to return in Return in about 1 year (around 5/7/2026) for Hypothyroidism f/u, annual.      Cathy Sanders MD        HPI:     History of Present Illness  Raymond Luis is a 45 year old female who presents for her annual physical.  Pap done in 2022 was normal, periods are regular but might come a couple of days early and not heavy.  Does do breast self exam, mammogram done  earlier this year was normal, does have dense breast tissue and has questions about additional imaging. No family history of breast ca.    with changes in her menstrual cycle and concerns about dense breast tissue.    She is experiencing changes in her menstrual cycle, with a lack of flow despite having a period each month. Her cycle has become slightly irregular, with periods arriving earlier than expected. She is aware she is going through perimenopause, as her periods have not completely stopped.    She has concerns about dense breast tissue, which was noted in a previous mammogram. She recalls not receiving any follow-up after her last mammogram but was informed about having dense tissue. She understands that dense breast tissue can make it difficult to detect small lesions on a routine mammogram.    She mentions a previous low vitamin B12 level and is considering having it rechecked.     She discusses her thyroid medication, which she obtains from [facility], and notes that she will continue with the same dose unless advised otherwise. She experiences dry skin, particularly on her hands, which becomes very dry in the winter and sweaty in the summer. She also experiences dryness in her eyes, especially when working on the computer, and uses 'Refreshed Ears' eye drops to alleviate the symptoms. Despite drinking plenty of water, she still feels some mouth dryness.      PAST MEDICAL HISTORY:   Past Medical History[1]  PAST SURGICAL HISTORY:   Past Surgical History[2]  ALLERGY:   Allergies[3]  MEDICATIONS:   Current Medications[4]  FAMILY HISTORY:   Family History[5]  SOCIAL HISTORY:   Short Social Hx on File[6]  REVIEW OF SYSTEMS:   Review of Systems   Constitutional:  Negative for appetite change, fatigue, fever and unexpected weight change.   HENT:  Negative for congestion, ear pain, hearing loss and sore throat.    Eyes:  Negative for discharge, redness and visual disturbance.   Respiratory:  Negative for cough,  chest tightness and shortness of breath.    Cardiovascular:  Negative for chest pain and palpitations.   Gastrointestinal:  Negative for abdominal pain, blood in stool, constipation and nausea.   Endocrine: Negative for cold intolerance, heat intolerance and polyuria.   Genitourinary:  Negative for difficulty urinating, dysuria, frequency and urgency.   Musculoskeletal:  Negative for arthralgias, gait problem, joint swelling and myalgias.   Skin:  Negative for rash.   Allergic/Immunologic: Negative for food allergies.   Neurological:  Negative for dizziness, weakness, numbness and headaches.   Hematological:  Negative for adenopathy. Does not bruise/bleed easily.   Psychiatric/Behavioral:  Negative for dysphoric mood and sleep disturbance. The patient is not nervous/anxious.         PHYSICAL EXAM:   /78   Pulse 72   Temp 97.2 °F (36.2 °C) (Temporal)   Resp 18   Ht 5' 2.68\" (1.592 m)   Wt 143 lb (64.9 kg)   LMP 04/24/2025 (Exact Date)   SpO2 98%   BMI 25.59 kg/m²     Physical Exam  Constitutional:       General: She is not in acute distress.     Appearance: Normal appearance. She is well-developed and normal weight.   HENT:      Head: Normocephalic and atraumatic.      Right Ear: Tympanic membrane, ear canal and external ear normal.      Left Ear: Tympanic membrane, ear canal and external ear normal.      Nose: Nose normal.      Mouth/Throat:      Mouth: Mucous membranes are moist.      Pharynx: Oropharynx is clear.   Eyes:      Extraocular Movements: Extraocular movements intact.      Conjunctiva/sclera: Conjunctivae normal.      Pupils: Pupils are equal, round, and reactive to light.   Neck:      Thyroid: No thyromegaly.   Cardiovascular:      Rate and Rhythm: Normal rate and regular rhythm.      Pulses: Normal pulses.      Heart sounds: Normal heart sounds. No murmur heard.  Pulmonary:      Effort: Pulmonary effort is normal. No respiratory distress.      Breath sounds: Normal breath sounds.   Chest:       Chest wall: No tenderness.   Abdominal:      General: Bowel sounds are normal. There is no distension.      Palpations: Abdomen is soft. There is no hepatomegaly, splenomegaly or mass.      Tenderness: There is no abdominal tenderness.      Hernia: No hernia is present.   Musculoskeletal:         General: Normal range of motion.      Cervical back: Normal range of motion and neck supple.      Right lower leg: No edema.      Left lower leg: No edema.   Lymphadenopathy:      Cervical: No cervical adenopathy.   Skin:     General: Skin is warm.      Findings: No rash.   Neurological:      General: No focal deficit present.      Mental Status: She is alert and oriented to person, place, and time.      Cranial Nerves: No cranial nerve deficit.      Sensory: No sensory deficit.      Motor: No weakness.      Coordination: Coordination normal.      Gait: Gait normal.      Deep Tendon Reflexes: Reflexes are normal and symmetric. Reflexes normal.   Psychiatric:         Mood and Affect: Mood normal.         Behavior: Behavior normal.         Thought Content: Thought content normal.         Judgment: Judgment normal.           The  Cures Act makes medical notes like these available to patients in the interest of transparency. Please be advised this is a medical document. Medical documents are intended to carry relevant information, facts as evident, and the clinical opinion of the practitioner. The medical note is intended as peer to peer communication and may appear blunt or direct. It is written in medical language and may contain abbreviations or verbiage that are unfamiliar.     Cathy Sanders MD             [1]   Past Medical History:   Hypothyroidism   [2]   Past Surgical History:  Procedure Laterality Date      2016    [place], [place]    [3] No Known Allergies  [4]   Current Outpatient Medications   Medication Sig Dispense Refill    levothyroxine 88 MCG Oral Tab Take 1 tablet (88  mcg total) by mouth before breakfast.      Cholecalciferol (VITAMIN D) 50 MCG (2000 UT) Oral Tab Take by mouth.     [5]   Family History  Problem Relation Age of Onset    Thyroid Disorder Father     Hypertension Mother    [6]   Social History  Socioeconomic History    Marital status:      Spouse name: Becka    Number of children: 2    Highest education level: Master's degree (e.g., MA, MS, Katarzyna, MEd, MSW, JAGJIT)   Occupational History    Occupation: homemaker   Tobacco Use    Smoking status: Never    Smokeless tobacco: Never   Vaping Use    Vaping status: Never Used   Substance and Sexual Activity    Alcohol use: No    Drug use: No    Sexual activity: Yes     Partners: Male     Birth control/protection: Rhythm   Other Topics Concern    Caffeine Concern No    Exercise No    Seat Belt No    Special Diet No    Stress Concern No    Weight Concern No   Social History Narrative    Balanced diet.     Social Drivers of Health     Food Insecurity: No Food Insecurity (5/7/2025)    NCSS - Food Insecurity     Worried About Running Out of Food in the Last Year: No     Ran Out of Food in the Last Year: No   Housing Stability: Not At Risk (5/7/2025)    NCSS - Housing/Utilities     Has Housing: Yes     Worried About Losing Housing: No     Unable to Get Utilities: No

## 2025-05-13 LAB
ABSOLUTE BASOPHILS: 18 CELLS/UL (ref 0–200)
ABSOLUTE EOSINOPHILS: 152 CELLS/UL (ref 15–500)
ABSOLUTE LYMPHOCYTES: 1776 CELLS/UL (ref 850–3900)
ABSOLUTE MONOCYTES: 304 CELLS/UL (ref 200–950)
ABSOLUTE NEUTROPHILS: 2351 CELLS/UL (ref 1500–7800)
ALBUMIN/GLOBULIN RATIO: 1.8 (CALC) (ref 1–2.5)
ALBUMIN: 4.4 G/DL (ref 3.6–5.1)
ALKALINE PHOSPHATASE: 60 U/L (ref 31–125)
ALT: 9 U/L (ref 6–29)
AST: 17 U/L (ref 10–30)
BASOPHILS: 0.4 %
BILIRUBIN, TOTAL: 0.7 MG/DL (ref 0.2–1.2)
BUN: 7 MG/DL (ref 7–25)
CALCIUM: 9.6 MG/DL (ref 8.6–10.2)
CARBON DIOXIDE: 27 MMOL/L (ref 20–32)
CHLORIDE: 104 MMOL/L (ref 98–110)
CHOL/HDLC RATIO: 3.3 (CALC)
CHOLESTEROL, TOTAL: 184 MG/DL
CREATININE: 0.68 MG/DL (ref 0.5–0.99)
EGFR: 110 ML/MIN/1.73M2
EOSINOPHILS: 3.3 %
GLOBULIN: 2.5 G/DL (CALC) (ref 1.9–3.7)
GLUCOSE: 84 MG/DL (ref 65–99)
HDL CHOLESTEROL: 55 MG/DL
HEMATOCRIT: 41.3 % (ref 35–45)
HEMOGLOBIN A1C: 5.4 %
HEMOGLOBIN: 13.3 G/DL (ref 11.7–15.5)
LDL-CHOLESTEROL: 109 MG/DL (CALC)
LYMPHOCYTES: 38.6 %
MCH: 30.3 PG (ref 27–33)
MCHC: 32.2 G/DL (ref 32–36)
MCV: 94.1 FL (ref 80–100)
MONOCYTES: 6.6 %
MPV: 11 FL (ref 7.5–12.5)
NEUTROPHILS: 51.1 %
NON-HDL CHOLESTEROL: 129 MG/DL (CALC)
PLATELET COUNT: 221 THOUSAND/UL (ref 140–400)
POTASSIUM: 5 MMOL/L (ref 3.5–5.3)
PROTEIN, TOTAL: 6.9 G/DL (ref 6.1–8.1)
RDW: 12.1 % (ref 11–15)
RED BLOOD CELL COUNT: 4.39 MILLION/UL (ref 3.8–5.1)
SODIUM: 139 MMOL/L (ref 135–146)
T4, FREE: 1.3 NG/DL (ref 0.8–1.8)
TRIGLYCERIDES: 105 MG/DL
TSH: 1.64 MIU/L
VITAMIN B12: 243 PG/ML (ref 200–1100)
VITAMIN D, 25-OH, TOTAL: 25 NG/ML (ref 30–100)
WHITE BLOOD CELL COUNT: 4.6 THOUSAND/UL (ref 3.8–10.8)

## 2025-08-01 DIAGNOSIS — E55.9 VITAMIN D DEFICIENCY: ICD-10-CM

## 2025-08-04 RX ORDER — ERGOCALCIFEROL 1.25 MG/1
50000 CAPSULE, LIQUID FILLED ORAL WEEKLY
Qty: 12 CAPSULE | Refills: 0 | OUTPATIENT
Start: 2025-08-04

## (undated) NOTE — LETTER
07/10/21        [address] [place] [address] [place] [phone]      Dear Vanessa Isabel,    [address] [facility] records indicate that you have outstanding lab work and or testing that was ordered for you and has not yet been completed:  [place]

## (undated) NOTE — Clinical Note
I had the pleasure of seeing Raymond Luis on 1/17/2024.  Please see my attached note.  Shelly Merino MD FACS [facility]--Surgery

## (undated) NOTE — LETTER
10/04/21        Latha Mendez [address] [facility] [phone]      Dear Theadore Galeazzi,    [address] [facility] records indicate that you have outstanding lab work and or testing that was ordered for you and has not yet been completed:  [place]

## (undated) NOTE — LETTER
11/18/19        Glenn Villa [address] [facility]      Dear [facility],    [address] [facility] records indicate that you have outstanding lab work and or testing that was ordered for you and has not yet been completed:  Orders P

## (undated) NOTE — LETTER
07/26/21        Saravanan Crow [address] [place] [phone]      Dear Elvia Cyr,    [address] [facility] records indicate that you have outstanding lab work and or testing that was ordered for you and has not yet been completed:  [place]